# Patient Record
Sex: FEMALE | Race: WHITE | NOT HISPANIC OR LATINO | Employment: FULL TIME | URBAN - METROPOLITAN AREA
[De-identification: names, ages, dates, MRNs, and addresses within clinical notes are randomized per-mention and may not be internally consistent; named-entity substitution may affect disease eponyms.]

---
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
## 2019-01-09 ENCOUNTER — TELEPHONE (OUTPATIENT)
Dept: FAMILY MEDICINE | Facility: CLINIC | Age: 19
End: 2019-01-09

## 2019-01-09 NOTE — TELEPHONE ENCOUNTER
Left message on voicemail advising patient to call back to reschedule her appointment, Catalina will not be in the office tomorrow.

## 2019-01-10 ENCOUNTER — DOCUMENTATION ONLY (OUTPATIENT)
Dept: FAMILY MEDICINE | Facility: CLINIC | Age: 19
End: 2019-01-10

## 2019-01-10 NOTE — PROGRESS NOTES
Pre-Visit Chart Review  For Appointment Scheduled on 1/11/19    Health Maintenance Due   Topic Date Due    Lipid Panel  1999    CHLAMYDIA SCREENING  09/21/2014    HPV Vaccines (1 - Female 3-dose series) 09/21/2014    TETANUS VACCINE  09/21/2017    Influenza Vaccine  08/01/2018

## 2019-01-11 ENCOUNTER — HOSPITAL ENCOUNTER (OUTPATIENT)
Dept: RADIOLOGY | Facility: CLINIC | Age: 19
Discharge: HOME | End: 2019-01-11
Attending: PHYSICIAN ASSISTANT
Payer: OTHER GOVERNMENT

## 2019-01-11 ENCOUNTER — OFFICE VISIT (OUTPATIENT)
Dept: FAMILY MEDICINE | Facility: CLINIC | Age: 19
End: 2019-01-11
Payer: OTHER GOVERNMENT

## 2019-01-11 VITALS
HEIGHT: 67 IN | WEIGHT: 159.38 LBS | SYSTOLIC BLOOD PRESSURE: 130 MMHG | BODY MASS INDEX: 25.01 KG/M2 | DIASTOLIC BLOOD PRESSURE: 91 MMHG | TEMPERATURE: 98 F | HEART RATE: 70 BPM

## 2019-01-11 DIAGNOSIS — Z72.0 NICOTINE VAPOR PRODUCT USER: ICD-10-CM

## 2019-01-11 DIAGNOSIS — G44.89 CHRONIC MIXED HEADACHE SYNDROME: Primary | ICD-10-CM

## 2019-01-11 DIAGNOSIS — G89.29 CHRONIC BILATERAL LOW BACK PAIN WITHOUT SCIATICA: ICD-10-CM

## 2019-01-11 DIAGNOSIS — M54.50 CHRONIC BILATERAL LOW BACK PAIN WITHOUT SCIATICA: ICD-10-CM

## 2019-01-11 DIAGNOSIS — R07.89 CHEST DISCOMFORT: ICD-10-CM

## 2019-01-11 PROCEDURE — 90686 FLU VACCINE (QUAD) GREATER THAN OR EQUAL TO 3YO PRESERVATIVE FREE IM: ICD-10-PCS | Mod: S$GLB,,, | Performed by: PHYSICIAN ASSISTANT

## 2019-01-11 PROCEDURE — 99214 OFFICE O/P EST MOD 30 MIN: CPT | Mod: 25,S$GLB,, | Performed by: PHYSICIAN ASSISTANT

## 2019-01-11 PROCEDURE — 90686 IIV4 VACC NO PRSV 0.5 ML IM: CPT | Mod: S$GLB,,, | Performed by: PHYSICIAN ASSISTANT

## 2019-01-11 PROCEDURE — 72100 X-RAY EXAM L-S SPINE 2/3 VWS: CPT | Mod: 26,,, | Performed by: RADIOLOGY

## 2019-01-11 PROCEDURE — 72100 X-RAY EXAM L-S SPINE 2/3 VWS: CPT | Mod: TC,FY,PO

## 2019-01-11 PROCEDURE — 99999 PR PBB SHADOW E&M-EST. PATIENT-LVL IV: ICD-10-PCS | Mod: PBBFAC,,, | Performed by: PHYSICIAN ASSISTANT

## 2019-01-11 PROCEDURE — 90471 IMMUNIZATION ADMIN: CPT | Mod: S$GLB,,, | Performed by: PHYSICIAN ASSISTANT

## 2019-01-11 PROCEDURE — 99999 PR PBB SHADOW E&M-EST. PATIENT-LVL IV: CPT | Mod: PBBFAC,,, | Performed by: PHYSICIAN ASSISTANT

## 2019-01-11 PROCEDURE — 72100 XR LUMBAR SPINE AP AND LATERAL: ICD-10-PCS | Mod: 26,,, | Performed by: RADIOLOGY

## 2019-01-11 PROCEDURE — 90471 FLU VACCINE (QUAD) GREATER THAN OR EQUAL TO 3YO PRESERVATIVE FREE IM: ICD-10-PCS | Mod: S$GLB,,, | Performed by: PHYSICIAN ASSISTANT

## 2019-01-11 PROCEDURE — 99214 PR OFFICE/OUTPT VISIT, EST, LEVL IV, 30-39 MIN: ICD-10-PCS | Mod: 25,S$GLB,, | Performed by: PHYSICIAN ASSISTANT

## 2019-01-11 RX ORDER — NAPROXEN 500 MG/1
TABLET ORAL
Qty: 60 TABLET | Refills: 0 | Status: SHIPPED | OUTPATIENT
Start: 2019-01-11 | End: 2019-02-07 | Stop reason: SDUPTHER

## 2019-01-11 NOTE — PROGRESS NOTES
"Subjective:       Patient ID: Candelario Nolan is a 18 y.o. female.    Chief Complaint: headaches, chest pain, and Flu Vaccine    HPI   Patient is a 18 year old female presenting to the clinic with multiple complaints.    1) Headaches- occurring daily. Usually always when waking up. Lasts all day unless she takes Goody's powder or exedrin which relieves the headache for a few hours. She has tried imitrex and naproxen without any relief. She gets poor sleep while at college- staying up to 3-4am and waking up early for morning classes, however, she does report getting good sleep while on [place] break (at least 8 hours) and headaches are not improving. She does get nauseated. She has vomited once with the headache. She denies any blurry vision, balance issues.  2) Chest pain- occurring daily, but at random intervals. Occasionally associated with right arm pain and tingling/numbness into her fingers. She also endorses occasionally SOB. She will feel fluttering in her chest that she associates with the CP and without CP. Of note, she is vaping nicotene. She also has the nexplanon birth control inserted into her skin. Her mom reports that she will get frequent phone calls from her daughter crying/saying "I feel like I am dying." She also endorses 2 recent episodes of panic. She reports suddenly feeling overwhelmed and mood changes.  3) Low back pain- chronic; states this has been occurring since she was young and noted scoliosis during grade school physicals. Pain is 6/10 at times depending on what she had been doing. No radiation into legs.   Review of Systems   Constitutional: Negative for activity change, appetite change, chills, diaphoresis, fatigue and fever.   HENT: Negative for congestion, postnasal drip and rhinorrhea.    Respiratory: Negative.  Negative for cough, shortness of breath and wheezing.    Cardiovascular: Positive for chest pain and palpitations. Negative for leg swelling.   Gastrointestinal: Positive " for nausea. Negative for abdominal pain, blood in stool, constipation, diarrhea and vomiting.   Genitourinary: Negative for dysuria, frequency, hematuria and urgency.   Musculoskeletal: Positive for back pain.   Skin: Negative.  Negative for color change and rash.   Neurological: Positive for headaches. Negative for dizziness and syncope.   Psychiatric/Behavioral: Positive for sleep disturbance. Negative for agitation, behavioral problems, confusion, self-injury and suicidal ideas. The patient is nervous/anxious.        Objective:      Physical Exam   Constitutional: Vital signs are normal. She appears well-developed and well-nourished. No distress.   Cardiovascular: Normal rate, regular rhythm, S1 normal, S2 normal and normal heart sounds. Exam reveals no gallop.   No murmur heard.  Pulses:       Radial pulses are 2+ on the right side, and 2+ on the left side.   <2sec cap refill fingers bilat     Pulmonary/Chest: Effort normal and breath sounds normal. No respiratory distress. She has no wheezes. She has no rhonchi.   Skin: Skin is warm and dry. She is not diaphoretic.   Appropriate skin turgor   Psychiatric: She has a normal mood and affect. Her speech is normal and behavior is normal. Judgment and thought content normal. Cognition and memory are normal.       Assessment:       1. Chronic mixed headache syndrome    2. Chest discomfort    3. Chronic bilateral low back pain without sciatica    4. Nicotine vapor product user        Plan:     Candelario was seen today for headaches, chest pain, and flu vaccine.    Diagnoses and all orders for this visit:    Chronic mixed headache syndrome  Jurado tart propranolol for prophylaxis treatment of possible migraines  Discussed adequate sleep, three meals daily, increase water intake  -     Ambulatory referral to Neurology  -     propranolol (INNOPRAN XL) 80 mg 24 hr capsule; Take 1 capsule (80 mg total) by mouth every evening.  -     MRI Brain W WO Contrast; Future    Chest  discomfort  Previous EKG normal, I Doubt ACS  Possibly MVP? R/O PE 2/2 birth control use in combination with nicotene use and history of SOB associated with CP  -     propranolol (INNOPRAN XL) 80 mg 24 hr capsule; Take 1 capsule (80 mg total) by mouth every evening.  -     Echocardiogram stress test (Cupid Only); Future  -     D dimer, quantitative; Future    May or may not related to anxiety with panic attacks?    Chronic bilateral low back pain without sciatica  -     X-Ray Lumbar Spine AP And Lateral; Future  Will f/u with imaging and discuss further recommendations    Nicotine vapor product user  -     D dimer, quantitative; Future

## 2019-01-18 ENCOUNTER — HOSPITAL ENCOUNTER (OUTPATIENT)
Dept: RADIOLOGY | Facility: HOSPITAL | Age: 19
Discharge: HOME | End: 2019-01-18
Attending: PHYSICIAN ASSISTANT
Payer: OTHER GOVERNMENT

## 2019-01-18 DIAGNOSIS — G44.89 CHRONIC MIXED HEADACHE SYNDROME: ICD-10-CM

## 2019-01-18 PROCEDURE — 70553 MRI BRAIN STEM W/O & W/DYE: CPT | Mod: TC

## 2019-01-18 PROCEDURE — A9585 GADOBUTROL INJECTION: HCPCS | Performed by: PHYSICIAN ASSISTANT

## 2019-01-18 PROCEDURE — 70553 MRI BRAIN W WO CONTRAST: ICD-10-PCS | Mod: 26,,, | Performed by: RADIOLOGY

## 2019-01-18 PROCEDURE — 25500020 PHARM REV CODE 255: Performed by: PHYSICIAN ASSISTANT

## 2019-01-18 PROCEDURE — 70553 MRI BRAIN STEM W/O & W/DYE: CPT | Mod: 26,,, | Performed by: RADIOLOGY

## 2019-01-18 RX ORDER — GADOBUTROL 604.72 MG/ML
INJECTION INTRAVENOUS
Status: DISCONTINUED
Start: 2019-01-18 | End: 2019-01-19 | Stop reason: HOSPADM

## 2019-01-18 RX ORDER — GADOBUTROL 604.72 MG/ML
7 INJECTION INTRAVENOUS
Status: COMPLETED | OUTPATIENT
Start: 2019-01-18 | End: 2019-01-18

## 2019-01-18 RX ADMIN — GADOBUTROL 7 ML: 604.72 INJECTION INTRAVENOUS at 06:01

## 2019-01-21 ENCOUNTER — TELEPHONE (OUTPATIENT)
Dept: FAMILY MEDICINE | Facility: CLINIC | Age: 19
End: 2019-01-21

## 2019-01-21 NOTE — TELEPHONE ENCOUNTER
----- Message from Osorio Andrews sent at 1/21/2019 12:02 PM CST -----  Contact: Patient  Returning call from 01-18-19 regarding test results.  Please call [phone] (C)

## 2019-01-24 ENCOUNTER — TELEPHONE (OUTPATIENT)
Dept: FAMILY MEDICINE | Facility: CLINIC | Age: 19
End: 2019-01-24

## 2019-01-24 NOTE — TELEPHONE ENCOUNTER
Please try and get in touch with patient regarding message about echocardiogram.  The [facility] has been trying to get in touch with them regarding outstanding balance.  Since echocardiogram is not urgent, the study will not be performed unless balances discussed.

## 2019-01-25 ENCOUNTER — CLINICAL SUPPORT (OUTPATIENT)
Dept: CARDIOLOGY | Facility: CLINIC | Age: 19
End: 2019-01-25
Attending: PHYSICIAN ASSISTANT
Payer: OTHER GOVERNMENT

## 2019-01-25 VITALS — HEIGHT: 67 IN | WEIGHT: 159.38 LBS | BODY MASS INDEX: 25.01 KG/M2

## 2019-01-25 DIAGNOSIS — R07.89 CHEST DISCOMFORT: ICD-10-CM

## 2019-01-25 LAB
ASCENDING AORTA: 2.43 CM
BSA FOR ECHO PROCEDURE: 1.85 M2
CV ECHO LV RWT: 0.17 CM
CV STRESS BASE HR: 63
DIASTOLIC BLOOD PRESSURE: 79
DOP CALC LVOT AREA: 3.05 CM2
DOP CALC LVOT DIAMETER: 1.97 CM
DOP CALC LVOT PEAK VEL: 1.08 M/S
DOP CALC LVOT STROKE VOLUME: 73.76 CM3
DOP CALCLVOT PEAK VEL VTI: 24.21 CM
E WAVE DECELERATION TIME: 153.24 MSEC
E/A RATIO: 1.5
ECHO LV POSTERIOR WALL: 0.49 CM (ref 0.6–1.1)
FRACTIONAL SHORTENING: 32 % (ref 28–44)
INTERVENTRICULAR SEPTUM: 0.54 CM (ref 0.6–1.1)
IVRT: 0.13 MSEC
LA MAJOR: 4.56 CM
LA WIDTH: 3.09 CM
LEFT ATRIUM SIZE: 3.14 CM
LEFT INTERNAL DIMENSION IN SYSTOLE: 3.89 CM (ref 2.1–4)
LEFT VENTRICLE DIASTOLIC VOLUME INDEX: 88.6 ML/M2
LEFT VENTRICLE DIASTOLIC VOLUME: 162.69 ML
LEFT VENTRICLE MASS INDEX: 55.2 G/M2
LEFT VENTRICLE SYSTOLIC VOLUME INDEX: 35.7 ML/M2
LEFT VENTRICLE SYSTOLIC VOLUME: 65.56 ML
LEFT VENTRICULAR INTERNAL DIMENSION IN DIASTOLE: 5.74 CM (ref 3.5–6)
LEFT VENTRICULAR MASS: 101.41 G
MV PEAK A VEL: 0.46 M/S
MV PEAK E VEL: 0.69 M/S
OHS CV CPX 1 MINUTE RECOVERY HEART RATE: 71 BPM
OHS CV CPX 85 PERCENT MAX PREDICTED HEART RATE MALE: 161
OHS CV CPX ESTIMATED METS: 10
OHS CV CPX MAX PREDICTED HEART RATE: 189
OHS CV CPX PATIENT IS FEMALE: 1
OHS CV CPX PATIENT IS MALE: 0
OHS CV CPX PEAK DIASTOLIC BLOOD PRESSURE: 66 MMHG
OHS CV CPX PEAK HEAR RATE: 136
OHS CV CPX PEAK RATE PRESSURE PRODUCT: ABNORMAL
OHS CV CPX PEAK SYSTOLIC BLOOD PRESSURE: 155
OHS CV CPX PERCENT MAX PREDICTED HEART RATE ACHIEVED: 72
OHS CV CPX PERCENT TARGET HEART RATE ACHIEVED: 71.96
OHS CV CPX RATE PRESSURE PRODUCT PRESENTING: 6993
OHS CV CPX TARGET HEART RATE: 189
PULM VEIN S/D RATIO: 0.95
PV PEAK D VEL: 0.38 M/S
PV PEAK S VEL: 0.36 M/S
RA MAJOR: 4.13 CM
RA WIDTH: 3.49 CM
SINUS: 2.55 CM
STJ: 2.24 CM
STRESS ECHO POST EXERCISE DUR MIN: 6 MIN
STRESS ECHO POST EXERCISE DUR SEC: 30
SYSTOLIC BLOOD PRESSURE: 111

## 2019-01-25 PROCEDURE — 93351 STRESS TTE COMPLETE: CPT | Mod: S$GLB,,, | Performed by: INTERNAL MEDICINE

## 2019-01-25 PROCEDURE — 99999 PR PBB SHADOW E&M-EST. PATIENT-LVL I: ICD-10-PCS | Mod: PBBFAC,,,

## 2019-01-25 PROCEDURE — 99999 PR PBB SHADOW E&M-EST. PATIENT-LVL I: CPT | Mod: PBBFAC,,,

## 2019-01-25 PROCEDURE — 93351 ECHOCARDIOGRAM STRESS TEST (CUPID ONLY): ICD-10-PCS | Mod: S$GLB,,, | Performed by: INTERNAL MEDICINE

## 2019-01-28 DIAGNOSIS — R07.89 CHEST DISCOMFORT: Primary | ICD-10-CM

## 2019-02-04 RX ORDER — TRAZODONE HYDROCHLORIDE 50 MG/1
TABLET ORAL
Qty: 30 TABLET | Refills: 0 | Status: SHIPPED | OUTPATIENT
Start: 2019-02-04 | End: 2019-03-08 | Stop reason: SDUPTHER

## 2019-02-07 DIAGNOSIS — G44.89 CHRONIC MIXED HEADACHE SYNDROME: ICD-10-CM

## 2019-02-07 DIAGNOSIS — R07.89 CHEST DISCOMFORT: ICD-10-CM

## 2019-02-07 RX ORDER — PROPRANOLOL HYDROCHLORIDE 80 MG/1
CAPSULE, EXTENDED RELEASE ORAL
Qty: 30 CAPSULE | Refills: 0 | Status: SHIPPED | OUTPATIENT
Start: 2019-02-07 | End: 2019-03-08 | Stop reason: SDUPTHER

## 2019-02-07 RX ORDER — NAPROXEN 500 MG/1
TABLET ORAL
Qty: 60 TABLET | Refills: 0 | Status: SHIPPED | OUTPATIENT
Start: 2019-02-07 | End: 2019-05-17

## 2019-03-08 DIAGNOSIS — G44.89 CHRONIC MIXED HEADACHE SYNDROME: ICD-10-CM

## 2019-03-08 DIAGNOSIS — R07.89 CHEST DISCOMFORT: ICD-10-CM

## 2019-03-08 RX ORDER — TRAZODONE HYDROCHLORIDE 50 MG/1
TABLET ORAL
Qty: 30 TABLET | Refills: 0 | Status: SHIPPED | OUTPATIENT
Start: 2019-03-08 | End: 2019-05-17

## 2019-03-08 RX ORDER — PROPRANOLOL HYDROCHLORIDE 80 MG/1
CAPSULE, EXTENDED RELEASE ORAL
Qty: 30 CAPSULE | Refills: 0 | Status: SHIPPED | OUTPATIENT
Start: 2019-03-08 | End: 2019-04-19 | Stop reason: SDUPTHER

## 2019-04-19 DIAGNOSIS — R07.89 CHEST DISCOMFORT: ICD-10-CM

## 2019-04-19 DIAGNOSIS — G44.89 CHRONIC MIXED HEADACHE SYNDROME: ICD-10-CM

## 2019-04-22 NOTE — TELEPHONE ENCOUNTER
Is this patient still seeing Dr. Willis for her pcp? If so she needs to get this from her, if not needs to establish

## 2019-04-23 RX ORDER — PROPRANOLOL HYDROCHLORIDE 80 MG/1
CAPSULE, EXTENDED RELEASE ORAL
Qty: 30 CAPSULE | Refills: 0 | Status: SHIPPED | OUTPATIENT
Start: 2019-04-23 | End: 2019-05-17 | Stop reason: SDUPTHER

## 2019-04-24 ENCOUNTER — PATIENT MESSAGE (OUTPATIENT)
Dept: FAMILY MEDICINE | Facility: CLINIC | Age: 19
End: 2019-04-24

## 2019-04-24 NOTE — TELEPHONE ENCOUNTER
Please see e-mail and reach out to patient's mother if there is an involvement of care on file as patient is 18 years old.      It appears that they made a mychart appointment with me for May. Kindly let them know that I am not accepting additional patients at this time as I am leaving.  If she is not following up with Dr. Willis who is listed as her PCP please have her establish care with a new PCP such as Dr. Galicia, Dr. Barber. They may have sooner appointments available and will be happy to address her concerns.

## 2019-05-17 ENCOUNTER — OFFICE VISIT (OUTPATIENT)
Dept: FAMILY MEDICINE | Facility: CLINIC | Age: 19
End: 2019-05-17
Payer: OTHER GOVERNMENT

## 2019-05-17 VITALS
HEART RATE: 60 BPM | HEIGHT: 67 IN | TEMPERATURE: 98 F | WEIGHT: 156.94 LBS | DIASTOLIC BLOOD PRESSURE: 79 MMHG | SYSTOLIC BLOOD PRESSURE: 115 MMHG | BODY MASS INDEX: 24.63 KG/M2

## 2019-05-17 DIAGNOSIS — G44.89 CHRONIC MIXED HEADACHE SYNDROME: ICD-10-CM

## 2019-05-17 DIAGNOSIS — F41.9 ANXIETY AND DEPRESSION: ICD-10-CM

## 2019-05-17 DIAGNOSIS — F32.A ANXIETY AND DEPRESSION: ICD-10-CM

## 2019-05-17 DIAGNOSIS — I10 ESSENTIAL HYPERTENSION: ICD-10-CM

## 2019-05-17 DIAGNOSIS — Z13.220 SCREENING FOR LIPID DISORDERS: Primary | ICD-10-CM

## 2019-05-17 PROBLEM — M54.50 CHRONIC BILATERAL LOW BACK PAIN WITHOUT SCIATICA: Status: ACTIVE | Noted: 2019-05-17

## 2019-05-17 PROBLEM — G47.09 OTHER INSOMNIA: Status: RESOLVED | Noted: 2019-05-17 | Resolved: 2019-05-17

## 2019-05-17 PROBLEM — Z72.0 NICOTINE VAPOR PRODUCT USER: Status: RESOLVED | Noted: 2019-05-17 | Resolved: 2019-05-17

## 2019-05-17 PROBLEM — G89.29 CHRONIC BILATERAL LOW BACK PAIN WITHOUT SCIATICA: Status: ACTIVE | Noted: 2019-05-17

## 2019-05-17 PROBLEM — Z72.0 NICOTINE VAPOR PRODUCT USER: Status: ACTIVE | Noted: 2019-05-17

## 2019-05-17 PROBLEM — G47.09 OTHER INSOMNIA: Status: ACTIVE | Noted: 2019-05-17

## 2019-05-17 PROCEDURE — 99999 PR PBB SHADOW E&M-EST. PATIENT-LVL III: CPT | Mod: PBBFAC,,, | Performed by: FAMILY MEDICINE

## 2019-05-17 PROCEDURE — 99213 PR OFFICE/OUTPT VISIT, EST, LEVL III, 20-29 MIN: ICD-10-PCS | Mod: S$GLB,,, | Performed by: FAMILY MEDICINE

## 2019-05-17 PROCEDURE — 99213 OFFICE O/P EST LOW 20 MIN: CPT | Mod: S$GLB,,, | Performed by: FAMILY MEDICINE

## 2019-05-17 PROCEDURE — 99999 PR PBB SHADOW E&M-EST. PATIENT-LVL III: ICD-10-PCS | Mod: PBBFAC,,, | Performed by: FAMILY MEDICINE

## 2019-05-17 RX ORDER — MEDROXYPROGESTERONE ACETATE 150 MG/ML
INJECTION, SUSPENSION INTRAMUSCULAR
Refills: 3 | COMMUNITY
Start: 2019-05-03 | End: 2022-12-05 | Stop reason: ALTCHOICE

## 2019-05-17 RX ORDER — ESCITALOPRAM OXALATE 10 MG/1
10 TABLET ORAL DAILY
COMMUNITY
End: 2019-05-17 | Stop reason: SDUPTHER

## 2019-05-17 RX ORDER — ESCITALOPRAM OXALATE 10 MG/1
10 TABLET ORAL DAILY
Qty: 90 TABLET | Refills: 1 | Status: SHIPPED | OUTPATIENT
Start: 2019-05-17 | End: 2019-07-18 | Stop reason: SDUPTHER

## 2019-05-17 RX ORDER — TOBRAMYCIN 3 MG/ML
SOLUTION/ DROPS OPHTHALMIC
COMMUNITY
Start: 2016-10-02 | End: 2019-05-17

## 2019-05-17 RX ORDER — NORETHINDRONE ACETATE AND ETHINYL ESTRADIOL AND FERROUS FUMARATE 1MG-20(24)
KIT ORAL
COMMUNITY
Start: 2016-09-22 | End: 2019-05-17

## 2019-05-17 NOTE — PROGRESS NOTES
Subjective:       Patient ID: Candelario Nolan is a 18 y.o. female.    Chief Complaint: Establish Care    HPI   Patient presents with her mother to establish care with a new family doctor and for medication refills.  She was previously followed by Dr. Vazquez last seen about 1 year ago but has been following with advanced practice providers within our system since this time.  She previously has had multitude of complaints that has led to a large workup for cardiac and neurologic causes but mother tells me they believe all of the symptoms have been secondary to anxiety and depression has she has been doing very well since establishing with the health clinic at her school ([facility] [employer] [facility] in [place]).  She was diagnosed with anxiety depression there and started on Lexapro 10 mg daily several months ago.  She has also been following with counselors there and notes that she has been feeling significantly better over time with less and less anxiety depression symptoms.  She currently denies any symptoms and tells me she feels fine without any feelings of helplessness, hopelessness or worthlessness.  She has not been having any tearful episodes recently and denies ever having any panic attacks but tells me her anxiety can get high enough where she has to leave social situations and then has felt depressed that she had to leave and be on social.  She is sleeping well now and has actually discontinue trazodone which she was using for sleep previously.  She is also eating well.  She has a history of high blood pressure and migraines but using Innopran and starting Lexapro she tells me her migraines are now well controlled and she no longer has to use sumatriptan for these.  She follows with her gyn for birth control and was just recently switched to Depo-Provera as she had some problems with vaginal bleeding with use of Nexplanon.  Vaginal bleeding has resolved.    Review of Systems   Constitutional:  "Negative for activity change, appetite change, fatigue and unexpected weight change.   Respiratory: Negative for cough, chest tightness, shortness of breath and wheezing.    Cardiovascular: Negative for chest pain, palpitations and leg swelling.   Gastrointestinal: Negative for abdominal pain, blood in stool, constipation, nausea and vomiting.   Genitourinary: Negative for decreased urine volume, difficulty urinating, dysuria, flank pain, frequency, genital sores, hematuria, menstrual problem, pelvic pain, urgency, vaginal bleeding, vaginal discharge and vaginal pain.   Musculoskeletal: Negative for arthralgias and myalgias.   Skin: Negative for rash and wound.   Neurological: Negative for headaches.   Hematological: Negative for adenopathy. Does not bruise/bleed easily.   Psychiatric/Behavioral: Negative for agitation, behavioral problems, confusion, decreased concentration, dysphoric mood, hallucinations, self-injury, sleep disturbance and suicidal ideas. The patient is not nervous/anxious and is not hyperactive.          Objective:      Vitals:    05/17/19 1259   BP: 115/79   Pulse: 60   Temp: 98.2 °F (36.8 °C)   TempSrc: Oral   Weight: 71.2 kg (156 lb 15.5 oz)   Height: 5' 7" (1.702 m)   PainSc: 0-No pain     Physical Exam   Constitutional: She is oriented to person, place, and time. She appears well-developed and well-nourished. No distress.   HENT:   Head: Normocephalic and atraumatic.   Right Ear: External ear normal.   Left Ear: External ear normal.   Nose: Nose normal.   Mouth/Throat: Oropharynx is clear and moist.   Eyes: Pupils are equal, round, and reactive to light. Conjunctivae and EOM are normal. No scleral icterus.   Neck: Trachea normal, normal range of motion and phonation normal. Neck supple. No JVD present. No thyroid mass and no thyromegaly present.   Cardiovascular: Normal rate, regular rhythm and normal heart sounds. Exam reveals no gallop and no friction rub.   No murmur " heard.  Pulmonary/Chest: Effort normal and breath sounds normal. No respiratory distress. She has no wheezes.   Abdominal: Soft. Bowel sounds are normal. She exhibits no distension and no mass. There is no tenderness. There is no guarding.   Musculoskeletal: Normal range of motion. She exhibits no edema or deformity.   Lymphadenopathy:     She has no cervical adenopathy.   Neurological: She is alert and oriented to person, place, and time.   Skin: Skin is warm and dry. She is not diaphoretic.   Psychiatric: She has a normal mood and affect. Her behavior is normal. Judgment and thought content normal.   Nursing note and vitals reviewed.        Assessment:       1. Screening for lipid disorders    2. Essential hypertension    3. Anxiety and depression    4. Chronic mixed headache syndrome          Plan:   Screening for lipid disorders  -     Lipid panel; Future; Expected date: 05/17/2019    Essential hypertension  -     propranolol (INNOPRAN XL) 80 mg 24 hr capsule; TAKE ONE CAPSULE BY MOUTH ONCE EVERY EVENING  Dispense: 90 capsule; Refill: 1    Anxiety and depression  -     escitalopram oxalate (LEXAPRO) 10 MG tablet; Take 1 tablet (10 mg total) by mouth once daily.  Dispense: 90 tablet; Refill: 1    Chronic mixed headache syndrome  -     propranolol (INNOPRAN XL) 80 mg 24 hr capsule; TAKE ONE CAPSULE BY MOUTH ONCE EVERY EVENING  Dispense: 90 capsule; Refill: 1      Follow up in about 6 months (around 11/17/2019).    Candelario appears to be stable and doing well today on her current medications and I am happy to take over prescribing her Lexapro as she no longer feels she needs to follow up with her care team at school.  I did advise her to continue with counseling if at all possible as studies have shown this as just as effective as SSRIs.  I did discuss the risks and benefits of all of her medications today and her blood pressures and migraines are well controlled as well.  I will provide her with 90 day supplies with  1 refill of her Lexapro and Innopran unchanged and follow up with her at 6 month interval as they request.  I did review health maintenance issues with them and they do not believe she has ever had a screening lipid panel completed.  She was not fasting today but they were interested in this order and will call back to schedule a lab appointment.  They tell me she is up-to-date with Tdap vaccination receiving this just prior to starting college and they have her vaccination card at home to show this and will return with a copy as they cannot recall where she received the vaccine and I do not see documentation of this through the links system.  They also believe she had all 3 doses of the Gardasil vaccination but not through her current gyn and are not sure where this was completed.  They have records of these as well and will drop these off.  She has had STD screening through her gyn this year and I have asked my nurse to obtain these records.  We will update all of these health maintenance issues once records are received.

## 2019-07-18 ENCOUNTER — PATIENT MESSAGE (OUTPATIENT)
Dept: FAMILY MEDICINE | Facility: CLINIC | Age: 19
End: 2019-07-18

## 2019-07-18 DIAGNOSIS — F32.A ANXIETY AND DEPRESSION: ICD-10-CM

## 2019-07-18 DIAGNOSIS — F41.9 ANXIETY AND DEPRESSION: ICD-10-CM

## 2019-07-18 RX ORDER — ESCITALOPRAM OXALATE 20 MG/1
20 TABLET ORAL DAILY
Qty: 90 TABLET | Refills: 1 | Status: SHIPPED | OUTPATIENT
Start: 2019-07-18 | End: 2020-01-13 | Stop reason: SDUPTHER

## 2019-11-18 DIAGNOSIS — I10 ESSENTIAL HYPERTENSION: ICD-10-CM

## 2019-11-18 DIAGNOSIS — G44.89 CHRONIC MIXED HEADACHE SYNDROME: ICD-10-CM

## 2020-01-13 DIAGNOSIS — F41.9 ANXIETY AND DEPRESSION: ICD-10-CM

## 2020-01-13 DIAGNOSIS — F32.A ANXIETY AND DEPRESSION: ICD-10-CM

## 2020-01-13 RX ORDER — ESCITALOPRAM OXALATE 20 MG/1
20 TABLET ORAL DAILY
Qty: 90 TABLET | Refills: 1 | Status: SHIPPED | OUTPATIENT
Start: 2020-01-13 | End: 2020-07-06

## 2020-03-20 ENCOUNTER — PATIENT MESSAGE (OUTPATIENT)
Dept: FAMILY MEDICINE | Facility: CLINIC | Age: 20
End: 2020-03-20

## 2020-03-20 NOTE — TELEPHONE ENCOUNTER
Spoke to patient via phone. Patient has been advised that Dr. Reyes is out of the clinic until Monday, she states that she will go to urgent care if things worsens. I let patient know that if there is anything else I can do to please let me know.

## 2020-03-27 ENCOUNTER — PATIENT MESSAGE (OUTPATIENT)
Dept: FAMILY MEDICINE | Facility: CLINIC | Age: 20
End: 2020-03-27

## 2020-03-27 ENCOUNTER — HOSPITAL ENCOUNTER (EMERGENCY)
Facility: HOSPITAL | Age: 20
Discharge: HOME | End: 2020-03-27
Attending: EMERGENCY MEDICINE
Payer: OTHER GOVERNMENT

## 2020-03-27 VITALS
TEMPERATURE: 98 F | HEART RATE: 70 BPM | DIASTOLIC BLOOD PRESSURE: 83 MMHG | RESPIRATION RATE: 18 BRPM | WEIGHT: 177 LBS | SYSTOLIC BLOOD PRESSURE: 120 MMHG | OXYGEN SATURATION: 99 % | HEIGHT: 67 IN | BODY MASS INDEX: 27.78 KG/M2

## 2020-03-27 DIAGNOSIS — R21 RASH: Primary | ICD-10-CM

## 2020-03-27 PROCEDURE — 99281 EMR DPT VST MAYX REQ PHY/QHP: CPT

## 2020-03-27 RX ORDER — DOXYCYCLINE 100 MG/1
CAPSULE ORAL
COMMUNITY
Start: 2020-03-25 | End: 2020-03-27

## 2020-03-27 RX ORDER — PREDNISONE 10 MG/1
TABLET ORAL
COMMUNITY
Start: 2020-03-25 | End: 2020-07-06

## 2020-03-27 RX ORDER — HYDROXYZINE HYDROCHLORIDE 25 MG/1
TABLET, FILM COATED ORAL
COMMUNITY
Start: 2020-03-25 | End: 2020-07-06

## 2020-03-28 NOTE — DISCHARGE INSTRUCTIONS
I think ER rash is more likely to be a drug reaction to the antibiotics either the azithromycin or the doxycycline than a true urticarial allergic reaction.  This is probably why the prednisone and Benadryl are not particularly helpful.  If you do have mycoplasma it is typically self limited and usually does not need antibiotics anyway.  Given that your symptoms are improving I think the antibiotics should be stopped as at this time they are likely to be causing the rash and probably not improving your other symptoms.  I am not sure right now why you have a headache and pain down your arms but there is no sign at all that you have meningitis which can be seen with several viral infections.  you need to return to the emergency room if your condition worsens after stopping the doxycycline.  Please use Benadryl as needed for the rash and you can continue the prednisone.  If you develop a high fever neck stiffness or worsening respiratory symptoms please return.

## 2020-03-28 NOTE — ED PROVIDER NOTES
Encounter Date: 3/27/2020    SCRIBE #1 NOTE: Kaia TATE, cayden scribing for, and in the presence of, Tyrone Huerta MD.       History     Chief Complaint   Patient presents with    Urticaria     for few days       Time seen by provider: 10:16 PM on 03/27/2020    Candelario Nolan is a 19 y.o. female without significant PMHx who presents to the ED with an onset of a diffuse, itchy rash x2 days. She began having a cough, congestion, and sore throat without fever one week ago. She reports she was positive for mycoplasma PNA at her [facility] clinic [facility]. She was negative flu, strep and COVID-19. She started on azithromycin monday but stopped taking it when the rash began Wednesday. She endorses that upper respiratory symptoms are improving. She denies improvement of rash with benadryl or rash creams. She also c/o intermittent temporal headache, neck pain and shooting pains down the arms. The patient denies shortness of breath, chest pain, neck stiffness, or any other symptoms at this time. No recent PSHx.  Denies fever.    The history is provided by the patient.     Review of patient's allergies indicates:   Allergen Reactions    Azithromycin Hives     Past Medical History:   Diagnosis Date    Hypertension      Past Surgical History:   Procedure Laterality Date    FOOT SURGERY Right     fracture repair     Family History   Problem Relation Age of Onset    Asthma Mother     Allergies Mother     Thyroid disease Father         hypothyroidism    Cancer Paternal Grandmother         lung cancer    Hypertension Paternal Grandfather     Diabetes Paternal Grandfather      Social History     Tobacco Use    Smoking status: Never Smoker    Smokeless tobacco: Never Used   Substance Use Topics    Alcohol use: No    Drug use: No     Review of Systems   Constitutional: Negative for fever.   HENT: Negative for congestion, rhinorrhea and sore throat.    Respiratory: Negative for shortness of breath.    Cardiovascular:  Negative for chest pain.   Gastrointestinal: Negative for nausea.   Musculoskeletal: Positive for neck pain (none currently). Negative for neck stiffness.   Skin: Positive for rash.   Neurological: Positive for headaches.   Hematological: Does not bruise/bleed easily.       Physical Exam     Initial Vitals [03/27/20 2213]   BP Pulse Resp Temp SpO2   120/83 70 18 97.7 °F (36.5 °C) 99 %      MAP       --         Physical Exam    Nursing note and vitals reviewed.  Constitutional: She appears well-developed and well-nourished. She is not diaphoretic. No distress.   Well-appearing   HENT:   Head: Normocephalic and atraumatic.   Eyes: EOM are normal.   Neck: Normal range of motion. Neck supple.   There is no neck stiffness, full range of motion   Cardiovascular: Normal rate and regular rhythm.   Pulmonary/Chest: Breath sounds normal. No respiratory distress. She has no wheezes.   Normal breath sounds   Musculoskeletal: Normal range of motion.   Neurological: She is alert and oriented to person, place, and time.   Skin: Skin is warm and dry. Rash noted.   Diffuse, mildly-indurated, faint splotchy rash. In a few areas does appear like hives but it predominantly is not representative of hives   Psychiatric: She has a normal mood and affect. Her behavior is normal. Judgment and thought content normal.         ED Course   Procedures  Labs Reviewed - No data to display       Imaging Results    None          Medical Decision Making:   History:   Old Medical Records: I decided to obtain old medical records.            Scribe Attestation:   Scribe #1: I performed the above scribed service and the documentation accurately describes the services I performed. I attest to the accuracy of the note.    I, Dr. Huerta, personally performed the services described in this documentation. All medical record entries made by the scribe were at my direction and in my presence.  I have reviewed the chart and agree that the record reflects my  personal performance and is accurate and complete.11:16 PM 03/27/2020            ED Course as of Mar 27 2244   Fri Mar 27, 2020   2231 19-year-old healthy college student presents to the emergency department complaining of a diffuse rash.  This started on Wednesday.  She was started on azithromycin on Monday for presumed mycoplasma.  She states she was tested at her [facility].  At that time she had a sore throat stuffy nose runny nose congestion, Classic URI symptoms.  She never had chest x-ray performed.  She has not had any shortness of breath.  She has also been having a headache.  She has described shooting pains down the arms at times.  She does not appear to have meningitis clinically.  I do not think she has any other life-threatening cause of headache such as idiopathic intracranial hypertension carotid or vertebral dissection.  She is able to shake her head rapidly from side to side.  She is afebrile.  I do not think she needs a lumbar puncture.  She stop the azithromycin several days ago after the rash began.  She has diffuse mild slightly indurated splotchy rash.  This is more pronounced several days ago in the pictures and appears to be a drug rash.  I do not think this is urticaria or an acute allergic reaction.  She is very well-appearing.  She has been taking doxycycline at this time.  Even if she does have mycoplasma this is typically self-limited.  I do not think she needs chest x-ray.  I think she needs to stop the doxycycline and continue only p.r.n. Benadryl and the prednisone that was prescribed.  I do not think she needs a chest x-ray at this time.  Normal vital signs, healthy female in no distress.  Tested earlier for corona virus.  She needs to stop antibiotics as this is likely clouding the picture and again I do not think this is necessarily indicated for a presumed mycoplasma infection as most of these are self-limited.  I have advised her to return to the ER if her condition  worsens but to stop the antibiotics as they might be causing a drug rash.  I think more likely to be a drug rash than urticaria at this time.    [EF]      ED Course User Index  [EF] Tyrone Huerta MD                Clinical Impression:       ICD-10-CM ICD-9-CM   1. Rash R21 782.1         Disposition:   Disposition: Discharged  Condition: Stable     ED Disposition Condition    Discharge Stable        ED Prescriptions     None        Follow-up Information     Follow up With Specialties Details Why Contact Info    [facility] Emergency Medicine  As needed, If symptoms worsen [address] [place] [id number] [phone]                                     Tyrone Huerta MD  03/27/20 6412

## 2020-05-05 ENCOUNTER — PATIENT MESSAGE (OUTPATIENT)
Dept: ADMINISTRATIVE | Facility: HOSPITAL | Age: 20
End: 2020-05-05

## 2020-07-06 ENCOUNTER — OFFICE VISIT (OUTPATIENT)
Dept: FAMILY MEDICINE | Facility: CLINIC | Age: 20
End: 2020-07-06
Payer: OTHER GOVERNMENT

## 2020-07-06 VITALS
DIASTOLIC BLOOD PRESSURE: 82 MMHG | BODY MASS INDEX: 29.38 KG/M2 | HEIGHT: 67 IN | WEIGHT: 187.19 LBS | OXYGEN SATURATION: 98 % | TEMPERATURE: 98 F | SYSTOLIC BLOOD PRESSURE: 106 MMHG | HEART RATE: 80 BPM

## 2020-07-06 DIAGNOSIS — G44.89 CHRONIC MIXED HEADACHE SYNDROME: Primary | ICD-10-CM

## 2020-07-06 DIAGNOSIS — F32.A ANXIETY AND DEPRESSION: ICD-10-CM

## 2020-07-06 DIAGNOSIS — F41.9 ANXIETY AND DEPRESSION: ICD-10-CM

## 2020-07-06 PROBLEM — I10 ESSENTIAL HYPERTENSION: Status: RESOLVED | Noted: 2019-05-17 | Resolved: 2020-07-06

## 2020-07-06 PROCEDURE — 99999 PR PBB SHADOW E&M-EST. PATIENT-LVL III: CPT | Mod: PBBFAC,,, | Performed by: FAMILY MEDICINE

## 2020-07-06 PROCEDURE — 99214 OFFICE O/P EST MOD 30 MIN: CPT | Mod: S$GLB,,, | Performed by: FAMILY MEDICINE

## 2020-07-06 PROCEDURE — 99214 PR OFFICE/OUTPT VISIT, EST, LEVL IV, 30-39 MIN: ICD-10-PCS | Mod: S$GLB,,, | Performed by: FAMILY MEDICINE

## 2020-07-06 PROCEDURE — 99999 PR PBB SHADOW E&M-EST. PATIENT-LVL III: ICD-10-PCS | Mod: PBBFAC,,, | Performed by: FAMILY MEDICINE

## 2020-07-06 RX ORDER — BUTALBITAL, ACETAMINOPHEN AND CAFFEINE 50; 325; 40 MG/1; MG/1; MG/1
1 TABLET ORAL EVERY 4 HOURS PRN
Qty: 30 TABLET | Refills: 0 | Status: SHIPPED | OUTPATIENT
Start: 2020-07-06 | End: 2020-08-05

## 2020-07-06 RX ORDER — ESCITALOPRAM OXALATE 20 MG/1
20 TABLET ORAL
COMMUNITY
End: 2020-07-08 | Stop reason: SDUPTHER

## 2020-07-06 RX ORDER — NABUMETONE 500 MG/1
500 TABLET, FILM COATED ORAL 2 TIMES DAILY
Qty: 60 TABLET | Refills: 0 | Status: SHIPPED | OUTPATIENT
Start: 2020-07-06 | End: 2020-07-06

## 2020-07-06 RX ORDER — PROPRANOLOL HYDROCHLORIDE 20 MG/1
20 TABLET ORAL
COMMUNITY
End: 2020-07-06

## 2020-07-06 NOTE — PROGRESS NOTES
Subjective:       Patient ID: Candelario Nolan is a 19 y.o. female.    Chief Complaint: Dizziness    Here today with complaints of persistent headaches following episode of dizziness and one episode of syncope.  She states that about two weeks ago she was at the dentist when she had her blood pressure checked and she was told her blood pressure was very low.  That night when she was in the shower she lost consciousness.  She was told to stop taking her blood pressure medicine which she did.  Her dizziness has resolved but since stopping her blood pressure medicine she has been having daily headaches.  She has a history of migraines and states these are not like her migraines.  They are just frontal headaches and seemed to happen every day.  She has tried taking ibuprofen and Aleve with some help.  When headaches are bad they are accompanied with some nausea or photophobia.    Review of Systems   Constitutional: Negative for activity change and unexpected weight change.   HENT: Negative for hearing loss, rhinorrhea and trouble swallowing.    Eyes: Negative for discharge and visual disturbance.   Respiratory: Negative for chest tightness and wheezing.    Cardiovascular: Negative for chest pain and palpitations.   Gastrointestinal: Negative for blood in stool, constipation, diarrhea and vomiting.   Endocrine: Negative for polydipsia and polyuria.   Genitourinary: Negative for difficulty urinating, dysuria, hematuria and menstrual problem.   Musculoskeletal: Negative for arthralgias, joint swelling and neck pain.   Neurological: Positive for headaches. Negative for weakness.   Psychiatric/Behavioral: Negative for confusion and dysphoric mood.         Objective:      Physical Exam  Vitals signs and nursing note reviewed.   Constitutional:       Appearance: She is well-developed.   HENT:      Head: Normocephalic and atraumatic.   Eyes:      Pupils: Pupils are equal, round, and reactive to light.   Neck:      Musculoskeletal:  Normal range of motion and neck supple.   Cardiovascular:      Rate and Rhythm: Normal rate and regular rhythm.      Heart sounds: No murmur.   Pulmonary:      Effort: Pulmonary effort is normal. No respiratory distress.      Breath sounds: Normal breath sounds. No wheezing or rales.   Abdominal:      General: There is no distension.      Palpations: Abdomen is soft.      Tenderness: There is no abdominal tenderness. There is no guarding.   Musculoskeletal: Normal range of motion.   Skin:     General: Skin is warm and dry.   Neurological:      Mental Status: She is alert and oriented to person, place, and time.      Deep Tendon Reflexes: Reflexes normal.   Psychiatric:         Behavior: Behavior normal.         Thought Content: Thought content normal.         Judgment: Judgment normal.         Assessment:       1. Chronic mixed headache syndrome    2. Anxiety and depression        Plan:       -advised patient to stop blood pressure medication as her blood pressure is low normal today on no meds for the last two weeks.  -will prescribe Relafen for headaches as well as Fioricet for severe migraine headaches.  -follow-up with PCP in about one month

## 2020-07-08 RX ORDER — ESCITALOPRAM OXALATE 20 MG/1
20 TABLET ORAL DAILY
Qty: 90 TABLET | Refills: 3 | Status: SHIPPED | OUTPATIENT
Start: 2020-07-08 | End: 2021-07-06 | Stop reason: SDUPTHER

## 2020-07-08 NOTE — TELEPHONE ENCOUNTER
Pt requesting Escitalopram.     LOV: 7/6/20 ([place])  LAB: 11/2/18  No upcoming appointment scheduled.

## 2020-10-15 LAB
HUMAN PAPILLOMAVIRUS (HPV): NORMAL
PAP RECOMMENDATION EXT: NORMAL
PAP SMEAR: NORMAL
PAP SMEAR: NORMAL

## 2020-10-26 ENCOUNTER — OFFICE VISIT (OUTPATIENT)
Dept: FAMILY MEDICINE | Facility: CLINIC | Age: 20
End: 2020-10-26
Payer: OTHER GOVERNMENT

## 2020-10-26 ENCOUNTER — PATIENT OUTREACH (OUTPATIENT)
Dept: ADMINISTRATIVE | Facility: HOSPITAL | Age: 20
End: 2020-10-26

## 2020-10-26 ENCOUNTER — LAB VISIT (OUTPATIENT)
Dept: PRIMARY CARE CLINIC | Facility: CLINIC | Age: 20
End: 2020-10-26
Payer: OTHER GOVERNMENT

## 2020-10-26 VITALS
HEART RATE: 67 BPM | OXYGEN SATURATION: 98 % | WEIGHT: 189.13 LBS | TEMPERATURE: 99 F | BODY MASS INDEX: 29.63 KG/M2 | DIASTOLIC BLOOD PRESSURE: 78 MMHG | SYSTOLIC BLOOD PRESSURE: 112 MMHG

## 2020-10-26 DIAGNOSIS — R10.9 ABDOMINAL PAIN, UNSPECIFIED ABDOMINAL LOCATION: Primary | ICD-10-CM

## 2020-10-26 DIAGNOSIS — Z86.16 HISTORY OF 2019 NOVEL CORONAVIRUS DISEASE (COVID-19): ICD-10-CM

## 2020-10-26 DIAGNOSIS — Z03.818 ENCNTR FOR OBS FOR SUSP EXPSR TO OTH BIOLG AGENTS RULED OUT: ICD-10-CM

## 2020-10-26 DIAGNOSIS — Z03.818 ENCNTR FOR OBS FOR SUSP EXPSR TO OTH BIOLG AGENTS RULED OUT: Primary | ICD-10-CM

## 2020-10-26 DIAGNOSIS — R43.0 LOSS OF SMELL: ICD-10-CM

## 2020-10-26 DIAGNOSIS — N30.00 ACUTE CYSTITIS WITHOUT HEMATURIA: ICD-10-CM

## 2020-10-26 LAB
B-HCG UR QL: NEGATIVE
BILIRUB SERPL-MCNC: ABNORMAL MG/DL
BLOOD URINE, POC: ABNORMAL
CLARITY, POC UA: ABNORMAL
COLOR, POC UA: YELLOW
CTP QC/QA: YES
GLUCOSE UR QL STRIP: NORMAL
KETONES UR QL STRIP: ABNORMAL
LEUKOCYTE ESTERASE URINE, POC: ABNORMAL
NITRITE, POC UA: ABNORMAL
PH, POC UA: 5
PROTEIN, POC: ABNORMAL
SPECIFIC GRAVITY, POC UA: 1.02
UROBILINOGEN, POC UA: NORMAL

## 2020-10-26 PROCEDURE — 81002 POCT URINE DIPSTICK WITHOUT MICROSCOPE: ICD-10-PCS | Mod: S$GLB,,, | Performed by: FAMILY MEDICINE

## 2020-10-26 PROCEDURE — U0003 INFECTIOUS AGENT DETECTION BY NUCLEIC ACID (DNA OR RNA); SEVERE ACUTE RESPIRATORY SYNDROME CORONAVIRUS 2 (SARS-COV-2) (CORONAVIRUS DISEASE [COVID-19]), AMPLIFIED PROBE TECHNIQUE, MAKING USE OF HIGH THROUGHPUT TECHNOLOGIES AS DESCRIBED BY CMS-2020-01-R: HCPCS

## 2020-10-26 PROCEDURE — 99214 OFFICE O/P EST MOD 30 MIN: CPT | Mod: 25,S$GLB,, | Performed by: FAMILY MEDICINE

## 2020-10-26 PROCEDURE — 99999 PR PBB SHADOW E&M-EST. PATIENT-LVL IV: ICD-10-PCS | Mod: PBBFAC,,, | Performed by: FAMILY MEDICINE

## 2020-10-26 PROCEDURE — 81002 URINALYSIS NONAUTO W/O SCOPE: CPT | Mod: S$GLB,,, | Performed by: FAMILY MEDICINE

## 2020-10-26 PROCEDURE — 90686 FLU VACCINE (QUAD) GREATER THAN OR EQUAL TO 3YO PRESERVATIVE FREE IM: ICD-10-PCS | Mod: S$GLB,,, | Performed by: FAMILY MEDICINE

## 2020-10-26 PROCEDURE — 90471 IMMUNIZATION ADMIN: CPT | Mod: S$GLB,,, | Performed by: FAMILY MEDICINE

## 2020-10-26 PROCEDURE — 99999 PR PBB SHADOW E&M-EST. PATIENT-LVL IV: CPT | Mod: PBBFAC,,, | Performed by: FAMILY MEDICINE

## 2020-10-26 PROCEDURE — 90471 FLU VACCINE (QUAD) GREATER THAN OR EQUAL TO 3YO PRESERVATIVE FREE IM: ICD-10-PCS | Mod: S$GLB,,, | Performed by: FAMILY MEDICINE

## 2020-10-26 PROCEDURE — 99214 PR OFFICE/OUTPT VISIT, EST, LEVL IV, 30-39 MIN: ICD-10-PCS | Mod: 25,S$GLB,, | Performed by: FAMILY MEDICINE

## 2020-10-26 PROCEDURE — 90686 IIV4 VACC NO PRSV 0.5 ML IM: CPT | Mod: S$GLB,,, | Performed by: FAMILY MEDICINE

## 2020-10-26 RX ORDER — NITROFURANTOIN 25; 75 MG/1; MG/1
100 CAPSULE ORAL 2 TIMES DAILY
Qty: 10 CAPSULE | Refills: 0 | Status: SHIPPED | OUTPATIENT
Start: 2020-10-26 | End: 2020-10-31

## 2020-10-26 RX ORDER — PANTOPRAZOLE SODIUM 40 MG/1
40 TABLET, DELAYED RELEASE ORAL DAILY
Qty: 30 TABLET | Refills: 11 | Status: SHIPPED | OUTPATIENT
Start: 2020-10-26 | End: 2024-03-08 | Stop reason: ALTCHOICE

## 2020-10-26 NOTE — PROGRESS NOTES
Subjective:       Patient ID: Candelario Nolan is a 20 y.o. female.    Chief Complaint: Abdominal Pain (bloating)    Patient presents today with a two week history of stomach pain that initially started in the right lower quadrant but has radiated over to the left lower quadrant.  Described as a feeling of period  Cramps.  Excepted always seems to be present after she eats.  It is sometimes associated with nausea.  A week ago she saw gyn in [place] where Pap smear was performed and she was told she had endometriosis.  She was given Flagyl as treatment which did not seem to help at all.  It was also suggested to her she could have IBS or gluten intolerance.  She attempted to cut gluten out of her diet but this is not change her symptoms at all.  She does report having some urinary symptoms along with this.  Mainly she is having increased urinary frequency.    She is also complaining of the prolonged inability to smell other than the sensation of smelling something burning.  She was diagnosed with COVID-19 three months ago with the initial symptom being loss of smell.  She has recovered but her loss of smell has persisted.    Also concerned about her current dose of Lexapro.  She has been on 40 mg for over a year but has noted she is still having certain episodes where she feels overly anxious.  She is wondering if it could also be related to her birth control.  She is currently using the Depo-Medrol shot and she is scheduled for next one today with her gyn.  However she does not seem to preferred as a form of birth control and was considering talking to her gyn about an alternative form.    Review of Systems   Constitutional: Negative for activity change, chills and fever.   HENT: Negative for nasal congestion and sinus pressure/congestion.    Eyes: Negative for itching.   Respiratory: Negative for chest tightness and shortness of breath.    Cardiovascular: Negative for chest pain and palpitations.    Gastrointestinal: Positive for abdominal pain, nausea and reflux. Negative for blood in stool, change in bowel habit, constipation, diarrhea, vomiting and change in bowel habit.   Endocrine: Negative for cold intolerance.   Genitourinary: Positive for dyspareunia, frequency and menstrual problem. Negative for difficulty urinating.   Musculoskeletal: Negative for arthralgias, joint swelling and myalgias.   Integumentary:  Negative for rash.   Allergic/Immunologic: Negative for environmental allergies.   Neurological: Negative for dizziness, weakness and headaches.   Psychiatric/Behavioral: Negative for agitation and confusion. The patient is nervous/anxious.          Objective:      Physical Exam  Vitals signs and nursing note reviewed.   Constitutional:       Appearance: She is well-developed.   HENT:      Head: Normocephalic and atraumatic.      Nose: No congestion or rhinorrhea.      Right Turbinates: Enlarged and swollen.      Left Turbinates: Enlarged and swollen.      Right Sinus: No maxillary sinus tenderness or frontal sinus tenderness.      Left Sinus: No maxillary sinus tenderness or frontal sinus tenderness.   Eyes:      Pupils: Pupils are equal, round, and reactive to light.   Neck:      Musculoskeletal: Normal range of motion and neck supple.   Cardiovascular:      Rate and Rhythm: Normal rate and regular rhythm.      Heart sounds: No murmur.   Pulmonary:      Effort: Pulmonary effort is normal. No respiratory distress.      Breath sounds: Normal breath sounds. No wheezing or rales.   Abdominal:      General: There is no distension.      Palpations: Abdomen is soft.      Tenderness: There is abdominal tenderness in the right lower quadrant and suprapubic area. There is guarding. There is no right CVA tenderness, left CVA tenderness or rebound. Negative signs include Phillips's sign and McBurney's sign.   Musculoskeletal: Normal range of motion.   Skin:     General: Skin is warm and dry.   Neurological:       Mental Status: She is alert and oriented to person, place, and time.      Deep Tendon Reflexes: Reflexes normal.   Psychiatric:         Behavior: Behavior normal.         Thought Content: Thought content normal.         Judgment: Judgment normal.         Recent Results (from the past 24 hour(s))   POCT urine dipstick without microscope    Collection Time: 10/26/20  8:42 AM   Result Value Ref Range    Color, UA Yellow     Spec Grav UA 1.020     pH, UA 5     WBC, UA ++     Nitrite, UA -     Protein trace     Glucose, UA normal     Ketones, UA neg     Urobilinogen, UA normal     Bilirubin ++     Blood, UA about 250     Clarity, UA Cloudy    POCT Urine Pregnancy    Collection Time: 10/26/20  8:42 AM   Result Value Ref Range    POC Preg Test, Ur Negative Negative     Acceptable Yes      Assessment:       1. Abdominal pain, unspecified abdominal location    2. Loss of smell    3. Acute cystitis without hematuria    4. History of 2019 novel coronavirus disease (COVID-19)        Plan:       Orders Placed This Encounter   Procedures    Influenza - Quadrivalent (PF)    Ambulatory referral/consult to ENT     Standing Status:   Future     Standing Expiration Date:   11/26/2021     Referral Priority:   Routine     Referral Type:   Consultation     Referral Reason:   Specialty Services Required     Requested Specialty:   Otolaryngology     Number of Visits Requested:   1    POCT urine dipstick without microscope    POCT Urine Pregnancy     -patient's UA does show some trace amounts of a possible UTI.  Will treat with Macrobid five days worth.  Have her follow-up with gyn today as well.  -patient has increased anxiety of which her Depo-Medrol injection may be a factor.  She is already on the maximum dose of Lexapro.  Rather than at an adjunct medication will have her discussed with gyn about a possible additional birth control and we will reassess after that.  -in addition to treating the UTI will also  treat patient's stomach issues with a combination of Protonix and Zantac.  Offer patient a GI referral for additional workup but she declined at this time.    -patient does have boggy turbinates and inflamed nares on nose exam.  Recommended she use a combination of Flonase and saline rinses for her symptoms.  Because these have persisted for several months offered patient a referral to ENT.  -patient is also requesting a flu shot today.

## 2020-10-26 NOTE — PROGRESS NOTES
CERVICAL gap report review. HM, chart, care everywhere, media reviewed.      HM updated with external report: PAP

## 2020-10-26 NOTE — PATIENT INSTRUCTIONS
Nitrofurantoin tablets or capsules  What is this medicine?  NITROFURANTOIN (dio strange) is an antibiotic. It is used to treat urinary tract infections.  How should I use this medicine?  Take this medicine by mouth with a glass of water. Follow the directions on the prescription label. Take this medicine with food or milk. Take your doses at regular intervals. Do not take your medicine more often than directed. Do not stop taking except on your doctor's advice.  Talk to your pediatrician regarding the use of this medicine in children. While this drug may be prescribed for selected conditions, precautions do apply.  What side effects may I notice from receiving this medicine?  Side effects that you should report to your doctor or health care professional as soon as possible:  · allergic reactions like skin rash or hives, swelling of the face, lips, or tongue  · chest pain  · cough  · difficulty breathing  · dizziness, drowsiness  · fever or infection  · joint aches or pains  · pale or blue-tinted skin  · redness, blistering, peeling or loosening of the skin, including inside the mouth  · tingling, burning, pain, or numbness in hands or feet  · unusual bleeding or bruising  · unusually weak or tired  · yellowing of eyes or skin  Side effects that usually do not require medical attention (report to your doctor or health care professional if they continue or are bothersome):  · dark urine  · diarrhea  · headache  · loss of appetite  · nausea or vomiting  · temporary hair loss  What may interact with this medicine?  · antacids containing magnesium trisilicate  · probenecid  · quinolone antibiotics like ciprofloxacin, lomefloxacin, norfloxacin and ofloxacin  · sulfinpyrazone  What if I miss a dose?  If you miss a dose, take it as soon as you can. If it is almost time for your next dose, take only that dose. Do not take double or extra doses.  Where should I keep my medicine?  Keep out of the reach of  children.  Store at room temperature between 15 and 30 degrees C (59 and 86 degrees F). Protect from light. Throw away any unused medicine after the expiration date.  What should I tell my health care provider before I take this medicine?  They need to know if you have any of these conditions:  · anemia  · diabetes  · glucose-6-phosphate dehydrogenase deficiency  · kidney disease  · liver disease  · lung disease  · other chronic illness  · an unusual or allergic reaction to nitrofurantoin, other antibiotics, other medicines, foods, dyes or preservatives  · pregnant or trying to get pregnant  · breast-feeding  What should I watch for while using this medicine?  Tell your doctor or health care professional if your symptoms do not improve or if you get new symptoms. Drink several glasses of water a day. If you are taking this medicine for a long time, visit your doctor for regular checks on your progress.  If you are diabetic, you may get a false positive result for sugar in your urine with certain brands of urine tests. Check with your doctor.  NOTE:This sheet is a summary. It may not cover all possible information. If you have questions about this medicine, talk to your doctor, pharmacist, or health care provider. Copyright© 2017 [employer]

## 2020-10-27 ENCOUNTER — TELEPHONE (OUTPATIENT)
Dept: OTOLARYNGOLOGY | Facility: CLINIC | Age: 20
End: 2020-10-27

## 2020-10-27 LAB — SARS-COV-2 RNA RESP QL NAA+PROBE: NOT DETECTED

## 2020-10-28 ENCOUNTER — OFFICE VISIT (OUTPATIENT)
Dept: OTOLARYNGOLOGY | Facility: CLINIC | Age: 20
End: 2020-10-28
Payer: OTHER GOVERNMENT

## 2020-10-28 VITALS
DIASTOLIC BLOOD PRESSURE: 99 MMHG | WEIGHT: 186.31 LBS | HEIGHT: 67 IN | BODY MASS INDEX: 29.24 KG/M2 | SYSTOLIC BLOOD PRESSURE: 136 MMHG | HEART RATE: 80 BPM

## 2020-10-28 DIAGNOSIS — R43.0 LOSS OF SMELL: Primary | ICD-10-CM

## 2020-10-28 PROCEDURE — 99204 PR OFFICE/OUTPT VISIT, NEW, LEVL IV, 45-59 MIN: ICD-10-PCS | Mod: 25,S$GLB,, | Performed by: OTOLARYNGOLOGY

## 2020-10-28 PROCEDURE — 31231 NASAL ENDOSCOPY DX: CPT | Mod: S$GLB,,, | Performed by: OTOLARYNGOLOGY

## 2020-10-28 PROCEDURE — 99204 OFFICE O/P NEW MOD 45 MIN: CPT | Mod: 25,S$GLB,, | Performed by: OTOLARYNGOLOGY

## 2020-10-28 PROCEDURE — 31231 PR NASAL ENDOSCOPY, DX: ICD-10-PCS | Mod: S$GLB,,, | Performed by: OTOLARYNGOLOGY

## 2020-10-28 NOTE — PROGRESS NOTES
Subjective:       Patient ID: Candelario Nolan is a 20 y.o. female.    Chief Complaint: Establish Care (Loss of smell x 4 months)    HPI   21 yo F presenting w altered sense of smell. 4 months ago she reports she had COVID-19 and completely lost her sense of smell and taste. Gradually her taste improved, but she still feels like she cannot smell most things. Lately has noticed a smell of burning or chemicals. Denies nosebleeds. Not a smoker.    Review of Systems   Constitutional: Negative for activity change, appetite change, chills, fever and unexpected weight change.   HENT: Negative for nasal congestion, ear discharge, ear pain, hearing loss, nosebleeds, rhinorrhea, sore throat, tinnitus, trouble swallowing and voice change.         Altered smell   Eyes: Negative for photophobia, pain and itching.   Respiratory: Negative for cough, shortness of breath, wheezing and stridor.    Cardiovascular: Negative for chest pain and palpitations.   Gastrointestinal: Negative for abdominal pain, constipation, diarrhea, nausea and vomiting.   Endocrine: Negative for cold intolerance and heat intolerance.   Genitourinary: Negative for difficulty urinating and dysuria.   Musculoskeletal: Negative for arthralgias, myalgias, neck pain and neck stiffness.   Integumentary:  Negative for pallor and rash.   Allergic/Immunologic: Negative for food allergies.   Neurological: Negative for dizziness, vertigo, seizures, syncope, facial asymmetry and headaches.   Psychiatric/Behavioral: Negative for agitation, confusion and hallucinations.         Objective:      Physical Exam  Constitutional:       Appearance: Normal appearance. She is well-developed.   HENT:      Head: Normocephalic and atraumatic.      Right Ear: Hearing, tympanic membrane, ear canal and external ear normal.      Left Ear: Hearing, tympanic membrane, ear canal and external ear normal.      Nose: Nose normal.      Comments: Procedure: Flexible fiberoptic  rhinoscopy/laryngoscopy  Bilateral nasal cavities examined with flexible scope  Nasal cavities are patent, middle and inferior turbinate healthy appearing  No visible polyps/masses/abnormalities     Mouth/Throat:      Pharynx: Uvula midline.   Eyes:      General: Lids are normal. Vision grossly intact.      Conjunctiva/sclera: Conjunctivae normal.      Pupils: Pupils are equal, round, and reactive to light.   Neck:      Musculoskeletal: Normal range of motion and neck supple.      Thyroid: No thyroid mass or thyromegaly.   Pulmonary:      Effort: Pulmonary effort is normal. No tachypnea.      Breath sounds: No stridor.   Skin:     General: Skin is warm and dry.   Neurological:      Mental Status: She is alert and oriented to person, place, and time.         Assessment:       1. Loss of smell        Plan:       19 yo with loss/altered smell after coronavirus infection    -scope exam wnl  -likely viral damage to nerve. Discussed prognosis uncertain if smell will return. Continue using nasal saline spray  -return to clinic as needed

## 2020-10-28 NOTE — LETTER
October 28, 2020      Kelechi Shook MD  [address]  [place] [address]           [place] [facility]  [address], [address]  [place] [address]  Phone: [phone]  Fax: [phone]          Patient: Candleario Nolan   MR Number: [id number]   YOB: 2000   Date of Visit: 10/28/2020       Dear Dr. Kelechi Shook:    Thank you for referring Candelario Nolan to me for evaluation. Attached you will find relevant portions of my assessment and plan of care.    If you have questions, please do not hesitate to call me. I look forward to following Candelario Nolan along with you.    Sincerely,    Nikia Valenzuela MD    Enclosure  CC:  No Recipients    If you would like to receive this communication electronically, please contact [email or web address] or [phone] to request more information on [employer] Link access.    For providers and/or their staff who would like to refer a patient to [facility], please contact us through our one-stop-shop provider referral line, [facility], at [phone].    If you feel you have received this communication in error or would no longer like to receive these types of communications, please e-mail [email or web address]

## 2021-04-12 ENCOUNTER — TELEPHONE (OUTPATIENT)
Dept: FAMILY MEDICINE | Facility: CLINIC | Age: 21
End: 2021-04-12

## 2021-05-12 ENCOUNTER — PATIENT MESSAGE (OUTPATIENT)
Dept: RESEARCH | Facility: HOSPITAL | Age: 21
End: 2021-05-12

## 2021-07-08 RX ORDER — ESCITALOPRAM OXALATE 20 MG/1
20 TABLET ORAL DAILY
Qty: 90 TABLET | Refills: 3 | Status: SHIPPED | OUTPATIENT
Start: 2021-07-08 | End: 2022-04-12

## 2021-12-27 ENCOUNTER — LAB VISIT (OUTPATIENT)
Dept: PRIMARY CARE CLINIC | Facility: OTHER | Age: 21
End: 2021-12-27
Attending: INTERNAL MEDICINE
Payer: OTHER GOVERNMENT

## 2021-12-27 DIAGNOSIS — Z20.822 ENCOUNTER FOR LABORATORY TESTING FOR COVID-19 VIRUS: ICD-10-CM

## 2021-12-27 PROCEDURE — U0003 INFECTIOUS AGENT DETECTION BY NUCLEIC ACID (DNA OR RNA); SEVERE ACUTE RESPIRATORY SYNDROME CORONAVIRUS 2 (SARS-COV-2) (CORONAVIRUS DISEASE [COVID-19]), AMPLIFIED PROBE TECHNIQUE, MAKING USE OF HIGH THROUGHPUT TECHNOLOGIES AS DESCRIBED BY CMS-2020-01-R: HCPCS | Performed by: INTERNAL MEDICINE

## 2021-12-28 ENCOUNTER — PATIENT MESSAGE (OUTPATIENT)
Dept: ADMINISTRATIVE | Facility: OTHER | Age: 21
End: 2021-12-28
Payer: OTHER GOVERNMENT

## 2021-12-29 ENCOUNTER — TELEPHONE (OUTPATIENT)
Dept: FAMILY MEDICINE | Facility: CLINIC | Age: 21
End: 2021-12-29
Payer: OTHER GOVERNMENT

## 2021-12-29 DIAGNOSIS — R11.0 NAUSEA: Primary | ICD-10-CM

## 2021-12-29 LAB
SARS-COV-2 RNA RESP QL NAA+PROBE: DETECTED
SARS-COV-2- CYCLE NUMBER: 16

## 2021-12-29 RX ORDER — ONDANSETRON 8 MG/1
8 TABLET, ORALLY DISINTEGRATING ORAL EVERY 8 HOURS PRN
Qty: 30 TABLET | Refills: 0 | Status: SHIPPED | OUTPATIENT
Start: 2021-12-29 | End: 2022-12-05 | Stop reason: ALTCHOICE

## 2022-04-12 ENCOUNTER — PATIENT OUTREACH (OUTPATIENT)
Dept: ADMINISTRATIVE | Facility: HOSPITAL | Age: 22
End: 2022-04-12
Payer: OTHER GOVERNMENT

## 2022-04-12 ENCOUNTER — OFFICE VISIT (OUTPATIENT)
Dept: FAMILY MEDICINE | Facility: CLINIC | Age: 22
End: 2022-04-12
Payer: OTHER GOVERNMENT

## 2022-04-12 VITALS
HEART RATE: 70 BPM | WEIGHT: 224.44 LBS | RESPIRATION RATE: 18 BRPM | BODY MASS INDEX: 35.22 KG/M2 | HEIGHT: 67 IN | TEMPERATURE: 98 F | OXYGEN SATURATION: 98 % | DIASTOLIC BLOOD PRESSURE: 74 MMHG | SYSTOLIC BLOOD PRESSURE: 118 MMHG

## 2022-04-12 DIAGNOSIS — F32.A ANXIETY AND DEPRESSION: Primary | ICD-10-CM

## 2022-04-12 DIAGNOSIS — Z11.59 NEED FOR HEPATITIS C SCREENING TEST: ICD-10-CM

## 2022-04-12 DIAGNOSIS — Z00.00 LABORATORY EXAMINATION ORDERED AS PART OF A ROUTINE GENERAL MEDICAL EXAMINATION: ICD-10-CM

## 2022-04-12 DIAGNOSIS — Z11.4 ENCOUNTER FOR SCREENING FOR HIV: ICD-10-CM

## 2022-04-12 DIAGNOSIS — R21 RASH: ICD-10-CM

## 2022-04-12 DIAGNOSIS — F41.9 ANXIETY AND DEPRESSION: Primary | ICD-10-CM

## 2022-04-12 PROCEDURE — 99213 PR OFFICE/OUTPT VISIT, EST, LEVL III, 20-29 MIN: ICD-10-PCS | Mod: S$GLB,,, | Performed by: PHYSICIAN ASSISTANT

## 2022-04-12 PROCEDURE — 99999 PR PBB SHADOW E&M-EST. PATIENT-LVL IV: ICD-10-PCS | Mod: PBBFAC,,, | Performed by: PHYSICIAN ASSISTANT

## 2022-04-12 PROCEDURE — 99999 PR PBB SHADOW E&M-EST. PATIENT-LVL IV: CPT | Mod: PBBFAC,,, | Performed by: PHYSICIAN ASSISTANT

## 2022-04-12 PROCEDURE — 99213 OFFICE O/P EST LOW 20 MIN: CPT | Mod: S$GLB,,, | Performed by: PHYSICIAN ASSISTANT

## 2022-04-12 RX ORDER — BUPROPION HYDROCHLORIDE 150 MG/1
150 TABLET ORAL DAILY
Qty: 30 TABLET | Refills: 11 | Status: SHIPPED | OUTPATIENT
Start: 2022-04-12 | End: 2022-04-27

## 2022-04-12 RX ORDER — CLOTRIMAZOLE AND BETAMETHASONE DIPROPIONATE 10; .64 MG/G; MG/G
CREAM TOPICAL 2 TIMES DAILY
Qty: 45 G | Refills: 0 | Status: SHIPPED | OUTPATIENT
Start: 2022-04-12

## 2022-04-12 NOTE — LETTER
AUTHORIZATION FOR RELEASE OF   CONFIDENTIAL INFORMATION    Dear Dr Whiteside,    We are seeing Candelario Nolan, date of birth 2000, in the clinic at [facility]. Kelechi Shook MD is the patient's PCP. Candelario Nolan has an outstanding lab/procedure at the time we reviewed her chart. In order to help keep her health information updated, she has authorized us to request the following medical record(s):        (  )  MAMMOGRAM                                      (  )  COLONOSCOPY      ( X )  PAP SMEAR                                          (  )  OUTSIDE LAB RESULTS     (  )  DEXA SCAN                                          (  )  EYE EXAM            (  )  FOOT EXAM                                          (  )  ENTIRE RECORD     (  )  OUTSIDE IMMUNIZATIONS                 (  )  _______________         Please fax records to [facility], Ryan Rhodes, MD at [phone]    Thanks so much and have a great day!    Sangita Gill LPN [facility]  [address] [place] [address]  P- [phone]   [phone]          Patient Name: Candelario Nolan  : 2000  Patient Phone #: [phone]

## 2022-04-12 NOTE — PROGRESS NOTES
Subjective:       Patient ID: Candelario Nolan is a 21 y.o. female.    Chief Complaint: Anxiety    Patient presents with complaints of uncontrolled anxiety.  She states she has been on Lexapro for the last 3-4 years and while used to work for her has not worked recently.  She states she has had a lot of events happened in her life that are stressful including the death of a friend and a recent break-up.  She states she is anxious most of the time and has intrusive thoughts.  She denies any suicidal or homicidal ideations.  She states these intrusive thoughts are bothersome to her she does not want to have them.  They cause her to start worrying.  The only medication she has ever taken is Lexapro.    Patient also complains of a rash under both arms in the axillary region from a deodorant.  She stopped using the topical and started using the spray but the rash has persisted.  She has had trouble with this brand of urine in the past.  She is unable to resolve the rash on her own.    Review of Systems   Constitutional: Negative for activity change, appetite change, fatigue, fever and unexpected weight change.   HENT: Negative for nasal congestion, dental problem, hearing loss, nosebleeds, postnasal drip, rhinorrhea, sinus pressure/congestion and trouble swallowing.    Eyes: Negative for photophobia, pain, discharge and visual disturbance.   Respiratory: Negative for cough, chest tightness, shortness of breath and wheezing.    Cardiovascular: Negative for chest pain, palpitations and leg swelling.   Gastrointestinal: Negative for abdominal pain, blood in stool, constipation, diarrhea, nausea and vomiting.   Genitourinary: Negative for difficulty urinating, dyspareunia, dysuria, frequency, hematuria, menstrual problem, pelvic pain, vaginal bleeding, vaginal discharge and vaginal pain.   Musculoskeletal: Negative for arthralgias, back pain, gait problem, joint swelling and neck pain.   Integumentary:  Positive for color change  and rash.   Neurological: Negative for dizziness, tremors, seizures, weakness, light-headedness, numbness and headaches.   Hematological: Does not bruise/bleed easily.   Psychiatric/Behavioral: Positive for dysphoric mood. Negative for agitation, behavioral problems, confusion, decreased concentration, hallucinations, self-injury, sleep disturbance and suicidal ideas. The patient is nervous/anxious. The patient is not hyperactive.          Objective:      Physical Exam  Constitutional:       Appearance: She is well-developed.   HENT:      Head: Normocephalic and atraumatic.   Eyes:      Conjunctiva/sclera: Conjunctivae normal.      Pupils: Pupils are equal, round, and reactive to light.   Neck:      Vascular: No JVD.   Cardiovascular:      Rate and Rhythm: Normal rate and regular rhythm.      Heart sounds: No murmur heard.    No friction rub. No gallop.   Pulmonary:      Effort: Pulmonary effort is normal. No respiratory distress.      Breath sounds: Normal breath sounds. No wheezing or rales.   Musculoskeletal:      Cervical back: Normal range of motion and neck supple.   Skin:     General: Skin is warm and dry.      Comments: Erythema under both axilla that is consistent with possible yeast dermatitis is with secondary contact dermatitis no broken skin or sign of infection   Neurological:      Mental Status: She is alert and oriented to person, place, and time.   Psychiatric:         Mood and Affect: Mood normal.         Behavior: Behavior normal.         Thought Content: Thought content normal.         Judgment: Judgment normal.         Assessment:       Problem List Items Addressed This Visit     Anxiety and depression - Primary    Relevant Medications    buPROPion (WELLBUTRIN XL) 150 MG TB24 tablet    Other Relevant Orders    TSH      Other Visit Diagnoses     Need for hepatitis C screening test        Relevant Orders    Hepatitis C Antibody    Encounter for screening for HIV        Relevant Orders    HIV 1/2  Ag/Ab (4th Gen)    Laboratory examination ordered as part of a routine general medical examination        Relevant Orders    CBC Auto Differential    Comprehensive Metabolic Panel    Lipid Panel    TSH    Rash        Uncontrolled, stop topical deodorant, use cream and use spray sensitive skin deodorant    Relevant Medications    clotrimazole-betamethasone 1-0.05% (LOTRISONE) cream          Plan:       Candelario was seen today for anxiety.    Diagnoses and all orders for this visit:    Anxiety and depression  Comments:  Uncontrolled, stop Lexapro start Wellbutrin 150 mg and follow up virtually in 2 weeks  Orders:  -     buPROPion (WELLBUTRIN XL) 150 MG TB24 tablet; Take 1 tablet (150 mg total) by mouth once daily.  -     TSH; Future    Need for hepatitis C screening test  -     Hepatitis C Antibody; Future    Encounter for screening for HIV  -     HIV 1/2 Ag/Ab (4th Gen); Future    Laboratory examination ordered as part of a routine general medical examination  -     CBC Auto Differential; Future  -     Comprehensive Metabolic Panel; Future  -     Lipid Panel; Future  -     TSH; Future    Rash  Comments:  Uncontrolled, stop topical deodorant, use cream and use spray sensitive skin deodorant  Orders:  -     clotrimazole-betamethasone 1-0.05% (LOTRISONE) cream; Apply topically 2 (two) times daily.    Patient to follow-up in 2 weeks virtually for a med check.  She will let us know of any problems with the medication before that time

## 2022-04-19 ENCOUNTER — PATIENT MESSAGE (OUTPATIENT)
Dept: FAMILY MEDICINE | Facility: CLINIC | Age: 22
End: 2022-04-19
Payer: OTHER GOVERNMENT

## 2022-04-19 NOTE — TELEPHONE ENCOUNTER
I called patient and spoke to her on the phone.  She stopped Lexapro 6 days ago and started Wellbutrin 5 days ago.  She states she stopped vaping last night and feels slightly better today but is still having some nausea and dizziness today.  We discussed this is likely from the start of Wellbutrin but if symptoms continue to worsen I wanted her to stop the medication.  As far as taking Wellbutrin while vaping being dangerous I could not find any information on that and told patient Wellbutrin is frequently used for smoking cessation and vaping itself is very dangerous and she would benefit by stopping vaping.  She expressed understanding and will let us know how she does.

## 2022-04-27 ENCOUNTER — OFFICE VISIT (OUTPATIENT)
Dept: FAMILY MEDICINE | Facility: CLINIC | Age: 22
End: 2022-04-27
Payer: OTHER GOVERNMENT

## 2022-04-27 DIAGNOSIS — F41.9 ANXIETY: Primary | ICD-10-CM

## 2022-04-27 PROCEDURE — 99213 OFFICE O/P EST LOW 20 MIN: CPT | Mod: 95,,, | Performed by: PHYSICIAN ASSISTANT

## 2022-04-27 PROCEDURE — 99213 PR OFFICE/OUTPT VISIT, EST, LEVL III, 20-29 MIN: ICD-10-PCS | Mod: 95,,, | Performed by: PHYSICIAN ASSISTANT

## 2022-04-27 RX ORDER — DULOXETIN HYDROCHLORIDE 30 MG/1
30 CAPSULE, DELAYED RELEASE ORAL DAILY
Qty: 30 CAPSULE | Refills: 11 | Status: SHIPPED | OUTPATIENT
Start: 2022-04-27 | End: 2023-04-18

## 2022-04-27 NOTE — PROGRESS NOTES
The patient location is: work  The chief complaint leading to consultation is: follow up anxiety    Visit type: audiovisual    Face to Face time with patient: 10 minutes of total time spent on the encounter, which includes face to face time and non-face to face time preparing to see the patient (eg, review of tests), Obtaining and/or reviewing separately obtained history, Documenting clinical information in the electronic or other health record, Independently interpreting results (not separately reported) and communicating results to the patient/family/caregiver, or Care coordination (not separately reported).         Each patient to whom he or she provides medical services by telemedicine is:  (1) informed of the relationship between the physician and patient and the respective role of any other health care provider with respect to management of the patient; and (2) notified that he or she may decline to receive medical services by telemedicine and may withdraw from such care at any time.    Notes:  Patient was unable to tolerate the Wellbutrin that was prescribed 2 weeks ago.  She was having severe side effects and feelings of near-syncope.  She states when she did stop the medication and restarted the Lexapro the symptoms resolved.  She is still having somewhat of a headache but is back on her Lexapro 20. She is still having symptoms of anxiety and wishes to try any medication.  She denies any suicidal or homicidal ideations.    Candelario was seen today for anxiety.    Diagnoses and all orders for this visit:    Anxiety  -     DULoxetine (CYMBALTA) 30 MG capsule; Take 1 capsule (30 mg total) by mouth once daily.    We are going to continue Lexapro 10 while patients are susceptible to 30 we will follow-up in 2 weeks.  Patient to let us know of any severe side effects for stopping the medication just like she did the Wellbutrin.  She expressed understanding.  After we get her established on Cymbalta, if this is the  right medication, we will wean her off Lexapro.    Answers for HPI/ROS submitted by the patient on 4/27/2022  activity change: No  hearing loss: No  trouble swallowing: No  eye discharge: No  chest tightness: No  wheezing: No  chest pain: No  palpitations: No  constipation: No  vomiting: No  diarrhea: No  difficulty urinating: No  hematuria: No  headaches: No  dysphoric mood: No

## 2022-05-11 ENCOUNTER — PATIENT MESSAGE (OUTPATIENT)
Dept: FAMILY MEDICINE | Facility: CLINIC | Age: 22
End: 2022-05-11
Payer: OTHER GOVERNMENT

## 2022-06-03 ENCOUNTER — OCCUPATIONAL HEALTH (OUTPATIENT)
Dept: URGENT CARE | Facility: CLINIC | Age: 22
End: 2022-06-03
Payer: OTHER GOVERNMENT

## 2022-06-03 DIAGNOSIS — Z13.9 ENCOUNTER FOR SCREENING: Primary | ICD-10-CM

## 2022-06-03 LAB — COLLECTION ONLY: NORMAL

## 2022-06-03 PROCEDURE — 80305 DRUG TEST PRSMV DIR OPT OBS: CPT | Mod: S$GLB,,, | Performed by: NURSE PRACTITIONER

## 2022-06-03 PROCEDURE — 80305 OOH NON-DOT DRUG SCREEN: ICD-10-PCS | Mod: S$GLB,,, | Performed by: NURSE PRACTITIONER

## 2022-06-16 ENCOUNTER — CLINICAL SUPPORT (OUTPATIENT)
Dept: SMOKING CESSATION | Facility: CLINIC | Age: 22
End: 2022-06-16
Payer: COMMERCIAL

## 2022-06-16 DIAGNOSIS — F17.200 NICOTINE DEPENDENCE: ICD-10-CM

## 2022-06-16 DIAGNOSIS — F17.200 NICOTINE DEPENDENCE: Primary | ICD-10-CM

## 2022-06-16 PROCEDURE — 99999 PR PBB SHADOW E&M-EST. PATIENT-LVL I: ICD-10-PCS | Mod: PBBFAC,,,

## 2022-06-16 PROCEDURE — 99404 PREV MED CNSL INDIV APPRX 60: CPT | Mod: S$GLB,,,

## 2022-06-16 PROCEDURE — 99404 PR PREVENT COUNSEL,INDIV,60 MIN: ICD-10-PCS | Mod: S$GLB,,,

## 2022-06-16 PROCEDURE — 99999 PR PBB SHADOW E&M-EST. PATIENT-LVL I: CPT | Mod: PBBFAC,,,

## 2022-06-16 RX ORDER — VARENICLINE TARTRATE 1 MG/1
TABLET, FILM COATED ORAL
Qty: 56 TABLET | Refills: 0 | Status: SHIPPED | OUTPATIENT
Start: 2022-06-16 | End: 2022-06-20

## 2022-06-16 NOTE — Clinical Note
Patient was seen in clinic today for tobacco cessation follow up. She has been using electronic cigarette for 5 years and she threw away her device today. She is ready to quit for health improvements. We discussed and reviewed: self awareness, triggers, habitual learned behaviors, treatment options, and follow up. She is motivated to quit for better health, as prepares for [employer]. She will begin her tobacco cessation regimen of Varenicline 1 mg BID once received from the [facility].

## 2022-06-20 RX ORDER — VARENICLINE TARTRATE 1 MG/1
TABLET, FILM COATED ORAL
Qty: 90 TABLET | Refills: 2 | Status: SHIPPED | OUTPATIENT
Start: 2022-06-20 | End: 2022-08-25 | Stop reason: SDUPTHER

## 2022-08-25 ENCOUNTER — CLINICAL SUPPORT (OUTPATIENT)
Dept: SMOKING CESSATION | Facility: CLINIC | Age: 22
End: 2022-08-25

## 2022-08-25 DIAGNOSIS — F17.210 HEAVY CIGARETTE SMOKER (20-39 PER DAY): Primary | ICD-10-CM

## 2022-08-25 PROCEDURE — 99999 PR PBB SHADOW E&M-EST. PATIENT-LVL II: CPT | Mod: PBBFAC,,,

## 2022-08-25 PROCEDURE — 99404 PR PREVENT COUNSEL,INDIV,60 MIN: ICD-10-PCS | Mod: S$GLB,,,

## 2022-08-25 PROCEDURE — 99999 PR PBB SHADOW E&M-EST. PATIENT-LVL II: ICD-10-PCS | Mod: PBBFAC,,,

## 2022-08-25 PROCEDURE — 99404 PREV MED CNSL INDIV APPRX 60: CPT | Mod: S$GLB,,,

## 2022-08-25 RX ORDER — VARENICLINE TARTRATE 1 MG/1
TABLET, FILM COATED ORAL
Qty: 52 TABLET | Refills: 0 | OUTPATIENT
Start: 2022-08-25 | End: 2022-09-19 | Stop reason: SDUPTHER

## 2022-08-25 RX ORDER — IBUPROFEN 200 MG
1 TABLET ORAL DAILY
Qty: 28 PATCH | Refills: 0 | Status: SHIPPED | OUTPATIENT
Start: 2022-08-25 | End: 2022-09-30

## 2022-08-25 RX ORDER — VARENICLINE TARTRATE 1 MG/1
1 TABLET, FILM COATED ORAL 2 TIMES DAILY
Qty: 52 TABLET | Refills: 0 | Status: SHIPPED | OUTPATIENT
Start: 2022-08-25 | End: 2022-08-25

## 2022-08-25 NOTE — Clinical Note
Patient was seen for tobacco cessation intake assesment.  Patient is vaping 40 times daily or more.  Patient will begin on starter dose Chantix and 21 mg nicotine patch daily. We discussed behavior change, cue, triggers, self awarness, and reduction/treatment plan.  Patient has agreed to bi weekly follow up.

## 2022-09-15 ENCOUNTER — CLINICAL SUPPORT (OUTPATIENT)
Dept: SMOKING CESSATION | Facility: CLINIC | Age: 22
End: 2022-09-15

## 2022-09-15 DIAGNOSIS — F17.210 MODERATE CIGARETTE SMOKER (10-19 PER DAY): Primary | ICD-10-CM

## 2022-09-15 DIAGNOSIS — F17.210 HEAVY CIGARETTE SMOKER (20-39 PER DAY): ICD-10-CM

## 2022-09-15 PROCEDURE — 99999 PR PBB SHADOW E&M-EST. PATIENT-LVL I: ICD-10-PCS | Mod: PBBFAC,,,

## 2022-09-15 PROCEDURE — 99406 BEHAV CHNG SMOKING 3-10 MIN: CPT | Mod: S$GLB,,,

## 2022-09-15 PROCEDURE — 99999 PR PBB SHADOW E&M-EST. PATIENT-LVL I: CPT | Mod: PBBFAC,,,

## 2022-09-15 PROCEDURE — 99406 PR TOBACCO USE CESSATION INTERMEDIATE 3-10 MINUTES: ICD-10-PCS | Mod: S$GLB,,,

## 2022-09-19 RX ORDER — VARENICLINE TARTRATE 1 MG/1
TABLET, FILM COATED ORAL
Qty: 52 TABLET | Refills: 0 | OUTPATIENT
Start: 2022-09-19 | End: 2023-01-18 | Stop reason: SDUPTHER

## 2022-10-06 ENCOUNTER — CLINICAL SUPPORT (OUTPATIENT)
Dept: SMOKING CESSATION | Facility: CLINIC | Age: 22
End: 2022-10-06
Payer: COMMERCIAL

## 2022-10-06 DIAGNOSIS — F17.210 HEAVY CIGARETTE SMOKER (20-39 PER DAY): ICD-10-CM

## 2022-10-06 DIAGNOSIS — F17.210 LIGHT CIGARETTE SMOKER (1-9 CIGS/DAY): Primary | ICD-10-CM

## 2022-10-06 PROCEDURE — 99999 PR PBB SHADOW E&M-EST. PATIENT-LVL I: CPT | Mod: PBBFAC,,,

## 2022-10-06 PROCEDURE — 99407 PR TOBACCO USE CESSATION INTENSIVE >10 MINUTES: ICD-10-PCS | Mod: S$GLB,,,

## 2022-10-06 PROCEDURE — 99407 BEHAV CHNG SMOKING > 10 MIN: CPT | Mod: S$GLB,,,

## 2022-10-06 PROCEDURE — 99999 PR PBB SHADOW E&M-EST. PATIENT-LVL I: ICD-10-PCS | Mod: PBBFAC,,,

## 2022-10-06 RX ORDER — IBUPROFEN 200 MG
1 TABLET ORAL DAILY
Qty: 28 PATCH | Refills: 0 | Status: SHIPPED | OUTPATIENT
Start: 2022-10-06 | End: 2022-12-05 | Stop reason: SDUPTHER

## 2022-11-01 ENCOUNTER — CLINICAL SUPPORT (OUTPATIENT)
Dept: SMOKING CESSATION | Facility: CLINIC | Age: 22
End: 2022-11-01

## 2022-11-01 DIAGNOSIS — F17.210 LIGHT CIGARETTE SMOKER (1-9 CIGS/DAY): Primary | ICD-10-CM

## 2022-11-01 PROCEDURE — 99999 PR PBB SHADOW E&M-EST. PATIENT-LVL I: CPT | Mod: PBBFAC,,,

## 2022-11-01 PROCEDURE — 99403 PR PREVENT COUNSEL,INDIV,45 MIN: ICD-10-PCS | Mod: S$GLB,,,

## 2022-11-01 PROCEDURE — 99999 PR PBB SHADOW E&M-EST. PATIENT-LVL I: ICD-10-PCS | Mod: PBBFAC,,,

## 2022-11-01 PROCEDURE — 99403 PREV MED CNSL INDIV APPRX 45: CPT | Mod: S$GLB,,,

## 2022-11-01 NOTE — Clinical Note
Patient was seen for in office follow up.  Patient reports relapsing after being around other smokers.  Patient is vaping 10 times a day or more.  Patient will attempt another quit next week.  Patient will resume use Chantix.  Explained to patient how to resume use.  We discussed and reviewed: triggers, managing stress & anxiety, support system, continued momentum, setting attainable goals, mindset & motivation, treatment plan and accountability. The patient denies any abnormal behavioral or mental changes at this time. The patient will continue with  therapy sessions and medication monitoring by CTTS. Prescribed medication management will be by physician.

## 2022-11-16 ENCOUNTER — CLINICAL SUPPORT (OUTPATIENT)
Dept: SMOKING CESSATION | Facility: CLINIC | Age: 22
End: 2022-11-16

## 2022-11-16 DIAGNOSIS — F17.210 LIGHT CIGARETTE SMOKER (1-9 CIGS/DAY): Primary | ICD-10-CM

## 2022-11-16 DIAGNOSIS — F17.210 HEAVY CIGARETTE SMOKER (20-39 PER DAY): ICD-10-CM

## 2022-11-16 PROCEDURE — 99999 PR PBB SHADOW E&M-EST. PATIENT-LVL I: CPT | Mod: PBBFAC,,,

## 2022-11-16 PROCEDURE — 99403 PR PREVENT COUNSEL,INDIV,45 MIN: ICD-10-PCS | Mod: S$GLB,,,

## 2022-11-16 PROCEDURE — 99999 PR PBB SHADOW E&M-EST. PATIENT-LVL I: ICD-10-PCS | Mod: PBBFAC,,,

## 2022-11-16 PROCEDURE — 99403 PREV MED CNSL INDIV APPRX 45: CPT | Mod: S$GLB,,,

## 2022-11-16 NOTE — PROGRESS NOTES
Individual Follow-Up Form    11/16/2022    Quit Date:     Clinical Status of Patient: Outpatient      Continuing Medication: yes  Chantix    Other Medications: nicotine patch   Target Symptoms: Withdrawal and medication side effects. The following were  rated moderate (3) to severe (4) on TCRS:  Moderate (3): none  Severe (4): none    Comments: Patient is vaping  less than 10 times daily.  Patient will attempt a quit by next visit. Patient was commended on her success thus far. Patient remains on prescribed tobacco cessation medication regimen of 21 mg patch without any negative side effects at this time. Completion of TCRS (Tobacco Cessation Rating Scale) reviewed strategies, habitual behavior, stress, and high risk situations. Introduced stress with addition interventions, SOLVE, relaxation with interventions, nutrition, exercise, weight gain, and the importance of rewarding oneself for accomplishments toward becoming tobacco free. Open discussion of all items with interventions.  The patient denies any abnormal behavioral or mental changes at this time. The patient will continue with  therapy sessions and medication monitoring by CTTS. Prescribed medication management will be by physician.      Diagnosis: F17.210    Next Visit: 2 weeks

## 2022-12-05 ENCOUNTER — OFFICE VISIT (OUTPATIENT)
Dept: FAMILY MEDICINE | Facility: CLINIC | Age: 22
End: 2022-12-05
Payer: OTHER GOVERNMENT

## 2022-12-05 ENCOUNTER — LAB VISIT (OUTPATIENT)
Dept: LAB | Facility: HOSPITAL | Age: 22
End: 2022-12-05
Attending: NURSE PRACTITIONER
Payer: OTHER GOVERNMENT

## 2022-12-05 VITALS
WEIGHT: 226.88 LBS | OXYGEN SATURATION: 98 % | TEMPERATURE: 99 F | BODY MASS INDEX: 35.61 KG/M2 | SYSTOLIC BLOOD PRESSURE: 118 MMHG | HEART RATE: 74 BPM | HEIGHT: 67 IN | DIASTOLIC BLOOD PRESSURE: 72 MMHG

## 2022-12-05 DIAGNOSIS — R11.0 NAUSEA: ICD-10-CM

## 2022-12-05 DIAGNOSIS — R50.9 FEVER, UNSPECIFIED FEVER CAUSE: Primary | ICD-10-CM

## 2022-12-05 DIAGNOSIS — R61 NIGHT SWEAT: ICD-10-CM

## 2022-12-05 DIAGNOSIS — R00.0 TACHYCARDIA: ICD-10-CM

## 2022-12-05 DIAGNOSIS — R50.9 FEVER, UNSPECIFIED FEVER CAUSE: ICD-10-CM

## 2022-12-05 LAB
ALBUMIN SERPL BCP-MCNC: 4.1 G/DL (ref 3.5–5.2)
ALP SERPL-CCNC: 53 U/L (ref 55–135)
ALT SERPL W/O P-5'-P-CCNC: 23 U/L (ref 10–44)
ANION GAP SERPL CALC-SCNC: 11 MMOL/L (ref 8–16)
AST SERPL-CCNC: 24 U/L (ref 10–40)
B-HCG UR QL: NEGATIVE
BILIRUB SERPL-MCNC: 0.4 MG/DL (ref 0.1–1)
BILIRUB SERPL-MCNC: NEGATIVE MG/DL
BLOOD URINE, POC: NEGATIVE
BUN SERPL-MCNC: 12 MG/DL (ref 6–20)
CALCIUM SERPL-MCNC: 10.3 MG/DL (ref 8.7–10.5)
CHLORIDE SERPL-SCNC: 103 MMOL/L (ref 95–110)
CLARITY, POC UA: CLEAR
CO2 SERPL-SCNC: 24 MMOL/L (ref 23–29)
COLOR, POC UA: YELLOW
CREAT SERPL-MCNC: 0.8 MG/DL (ref 0.5–1.4)
CTP QC/QA: YES
EST. GFR  (NO RACE VARIABLE): >60 ML/MIN/1.73 M^2
GLUCOSE SERPL-MCNC: 85 MG/DL (ref 70–110)
GLUCOSE UR QL STRIP: NEGATIVE
KETONES UR QL STRIP: NEGATIVE
LEUKOCYTE ESTERASE URINE, POC: NORMAL
NITRITE, POC UA: NEGATIVE
PH, POC UA: 6
POTASSIUM SERPL-SCNC: 4 MMOL/L (ref 3.5–5.1)
PROT SERPL-MCNC: 7.2 G/DL (ref 6–8.4)
PROTEIN, POC: NEGATIVE
SODIUM SERPL-SCNC: 138 MMOL/L (ref 136–145)
SPECIFIC GRAVITY, POC UA: 1.02
UROBILINOGEN, POC UA: 0.2

## 2022-12-05 PROCEDURE — 99214 OFFICE O/P EST MOD 30 MIN: CPT | Mod: S$GLB,,, | Performed by: NURSE PRACTITIONER

## 2022-12-05 PROCEDURE — 81025 URINE PREGNANCY TEST: CPT | Mod: S$GLB,,, | Performed by: NURSE PRACTITIONER

## 2022-12-05 PROCEDURE — 99999 PR PBB SHADOW E&M-EST. PATIENT-LVL III: CPT | Mod: PBBFAC,,, | Performed by: NURSE PRACTITIONER

## 2022-12-05 PROCEDURE — 99214 PR OFFICE/OUTPT VISIT, EST, LEVL IV, 30-39 MIN: ICD-10-PCS | Mod: S$GLB,,, | Performed by: NURSE PRACTITIONER

## 2022-12-05 PROCEDURE — 99999 PR PBB SHADOW E&M-EST. PATIENT-LVL III: ICD-10-PCS | Mod: PBBFAC,,, | Performed by: NURSE PRACTITIONER

## 2022-12-05 PROCEDURE — 81025 POCT URINE PREGNANCY: ICD-10-PCS | Mod: S$GLB,,, | Performed by: NURSE PRACTITIONER

## 2022-12-05 PROCEDURE — 80053 COMPREHEN METABOLIC PANEL: CPT | Performed by: NURSE PRACTITIONER

## 2022-12-05 PROCEDURE — 93010 EKG 12-LEAD: ICD-10-PCS | Mod: S$GLB,,, | Performed by: INTERNAL MEDICINE

## 2022-12-05 PROCEDURE — 93005 ELECTROCARDIOGRAM TRACING: CPT | Mod: S$GLB,,, | Performed by: NURSE PRACTITIONER

## 2022-12-05 PROCEDURE — 36415 COLL VENOUS BLD VENIPUNCTURE: CPT | Mod: PO | Performed by: NURSE PRACTITIONER

## 2022-12-05 PROCEDURE — 81002 POCT URINE DIPSTICK WITHOUT MICROSCOPE: ICD-10-PCS | Mod: S$GLB,,, | Performed by: NURSE PRACTITIONER

## 2022-12-05 PROCEDURE — 93010 ELECTROCARDIOGRAM REPORT: CPT | Mod: S$GLB,,, | Performed by: INTERNAL MEDICINE

## 2022-12-05 PROCEDURE — 86580 TB INTRADERMAL TEST: CPT | Mod: S$GLB,,, | Performed by: NURSE PRACTITIONER

## 2022-12-05 PROCEDURE — 93005 EKG 12-LEAD: ICD-10-PCS | Mod: S$GLB,,, | Performed by: NURSE PRACTITIONER

## 2022-12-05 PROCEDURE — 81002 URINALYSIS NONAUTO W/O SCOPE: CPT | Mod: S$GLB,,, | Performed by: NURSE PRACTITIONER

## 2022-12-05 PROCEDURE — 86580 POCT TB SKIN TEST: ICD-10-PCS | Mod: S$GLB,,, | Performed by: NURSE PRACTITIONER

## 2022-12-05 PROCEDURE — 85025 COMPLETE CBC W/AUTO DIFF WBC: CPT | Performed by: NURSE PRACTITIONER

## 2022-12-05 RX ORDER — IBUPROFEN 200 MG
1 TABLET ORAL DAILY
Qty: 28 PATCH | Refills: 0 | Status: SHIPPED | OUTPATIENT
Start: 2022-12-05 | End: 2023-01-18 | Stop reason: SDUPTHER

## 2022-12-05 NOTE — PATIENT INSTRUCTIONS
Broderick Palomino,     If you are due for any health screening(s) below please notify me so we can arrange them to be ordered and scheduled to maintain your health. Most healthy patients complete it. Don't lose out on improving your health.     All of your core healthy metrics are met.

## 2022-12-05 NOTE — PROGRESS NOTES
This dictation has been generated using Modal Fluency Dictation some phonetic errors may occur. Please contact author for clarification if needed.     Problem List Items Addressed This Visit    None  Visit Diagnoses       Fever, unspecified fever cause    -  Primary    Relevant Orders    CBC Auto Differential    POCT TB Skin Test Read (Completed)    IN OFFICE EKG 12-LEAD (to [place])    Urinalysis    POCT Urine Pregnancy (Completed)    Comprehensive Metabolic Panel    POCT URINE DIPSTICK WITHOUT MICROSCOPE (Completed)    Night sweat        Nausea        Relevant Orders    POCT Urine Pregnancy (Completed)    Comprehensive Metabolic Panel    Tachycardia        Relevant Orders    Stress Echo Which stress agent will be used? Treadmill Exercise; Color Flow Doppler? No    Holter monitor - 24 hour            Orders Placed This Encounter    CBC Auto Differential    Urinalysis    Comprehensive Metabolic Panel    Holter monitor - 24 hour    POCT TB Skin Test Read    POCT Urine Pregnancy    POCT URINE DIPSTICK WITHOUT MICROSCOPE    IN OFFICE EKG 12-LEAD (to [place])    Stress Echo Which stress agent will be used? Treadmill Exercise; Color Flow Doppler? No     Fever night sweats nausea tachycardia check labs as above.  Rule out TB.  Check EKG I reviewed the images no AMI.  No atrial fibrillation.  Right bundle branch block proceed with stress echo and Holter.  Urinalysis and POCT urine pregnancy acceptable.    Follow up in about 15 days (around 12/20/2022).    ________________________________________________________________  ________________________________________________________________      Chief Complaint   Patient presents with    Fever    Nausea     History of present illness  This 22 y.o. presents today for complaint of fever for greater than a week.  Denies any cold symptoms.  Did have some night sweats.  Patient denies  symptoms.  No weight loss.  No cough or shortness of breath.  Nausea without vomiting or diarrhea.  No  rash.    Past Medical History:   Diagnosis Date    Hypertension        Past Surgical History:   Procedure Laterality Date    FOOT SURGERY Right     fracture repair       Family History   Problem Relation Age of Onset    Asthma Mother     Allergies Mother     Thyroid disease Father         hypothyroidism    Cancer Paternal Grandmother         lung cancer    Hypertension Paternal Grandfather     Diabetes Paternal Grandfather        Social History     Socioeconomic History    Marital status: Single   Tobacco Use    Smoking status: Never    Smokeless tobacco: Never   Substance and Sexual Activity    Alcohol use: No    Drug use: No       Current Outpatient Medications   Medication Sig Dispense Refill    clotrimazole-betamethasone 1-0.05% (LOTRISONE) cream Apply topically 2 (two) times daily. 45 g 0    DULoxetine (CYMBALTA) 30 MG capsule Take 1 capsule (30 mg total) by mouth once daily. 30 capsule 11    varenicline (CHANTIX) 1 mg Tab Take ½ tablet once a day for 3 days, then increase to ½ tablet twice a day for 4 days. Beginning on Day 8, take 1 tablet twice daily until complete 52 tablet 0    nicotine (NICODERM CQ) 21 mg/24 hr Place 1 patch onto the skin once daily. 28 patch 0    pantoprazole (PROTONIX) 40 MG tablet Take 1 tablet (40 mg total) by mouth once daily. 30 tablet 11    ranitidine (ZANTAC) 150 MG tablet Take 1 tablet (150 mg total) by mouth 2 (two) times daily. 60 tablet 11     No current facility-administered medications for this visit.       Review of patient's allergies indicates:   Allergen Reactions    Azithromycin Hives    Amoxicillin     Wellbutrin [bupropion hcl] Other (See Comments)     Dizziness, near syncope       Physical examination  Vitals Reviewed\  Vitals:    12/05/22 1302   BP: 118/72   Pulse: 74   Temp: 98.6 °F (37 °C)     Body mass index is 35.53 kg/m².    Weight: 102.9 kg (226 lb 13.7 oz)    Gen. Well-dressed well-nourished   Skin warm dry and intact.  No rashes noted.  Neck is supple  without adenopathy  Chest.  Respirations are even unlabored.  Lungs are clear to auscultation.  Cardiac regular rate and rhythm.  No chest wall adenopathy noted.  Neuro. Awake alert oriented x4.  Normal judgment and cognition noted.  Extremities no clubbing cyanosis or edema noted.     Call or return to clinic prn if these symptoms worsen or fail to improve as anticipated.

## 2022-12-05 NOTE — PROGRESS NOTES
Tuberculin skin test applied to Left ventral forearm. Explained how to read the test, measuring induration not just erythema; she will come into office if test appears positive.

## 2022-12-06 LAB
BASOPHILS # BLD AUTO: 0.03 K/UL (ref 0–0.2)
BASOPHILS NFR BLD: 0.4 % (ref 0–1.9)
DIFFERENTIAL METHOD: NORMAL
EOSINOPHIL # BLD AUTO: 0.2 K/UL (ref 0–0.5)
EOSINOPHIL NFR BLD: 2.1 % (ref 0–8)
ERYTHROCYTE [DISTWIDTH] IN BLOOD BY AUTOMATED COUNT: 13.7 % (ref 11.5–14.5)
HCT VFR BLD AUTO: 43.6 % (ref 37–48.5)
HGB BLD-MCNC: 14 G/DL (ref 12–16)
IMM GRANULOCYTES # BLD AUTO: 0.02 K/UL (ref 0–0.04)
IMM GRANULOCYTES NFR BLD AUTO: 0.3 % (ref 0–0.5)
LYMPHOCYTES # BLD AUTO: 2.4 K/UL (ref 1–4.8)
LYMPHOCYTES NFR BLD: 30.5 % (ref 18–48)
MCH RBC QN AUTO: 29.2 PG (ref 27–31)
MCHC RBC AUTO-ENTMCNC: 32.1 G/DL (ref 32–36)
MCV RBC AUTO: 91 FL (ref 82–98)
MONOCYTES # BLD AUTO: 0.6 K/UL (ref 0.3–1)
MONOCYTES NFR BLD: 8 % (ref 4–15)
NEUTROPHILS # BLD AUTO: 4.6 K/UL (ref 1.8–7.7)
NEUTROPHILS NFR BLD: 58.7 % (ref 38–73)
NRBC BLD-RTO: 0 /100 WBC
PLATELET # BLD AUTO: 273 K/UL (ref 150–450)
PMV BLD AUTO: 11.3 FL (ref 9.2–12.9)
RBC # BLD AUTO: 4.8 M/UL (ref 4–5.4)
WBC # BLD AUTO: 7.75 K/UL (ref 3.9–12.7)

## 2022-12-07 ENCOUNTER — CLINICAL SUPPORT (OUTPATIENT)
Dept: FAMILY MEDICINE | Facility: CLINIC | Age: 22
End: 2022-12-07
Payer: OTHER GOVERNMENT

## 2022-12-07 LAB
TB INDURATION - 48 HR READ: 0 MM
TB INDURATION - 72 HR READ: NORMAL
TB SKIN TEST - 48 HR READ: NEGATIVE
TB SKIN TEST - 72 HR READ: NORMAL

## 2022-12-07 NOTE — PROGRESS NOTES
PPD Reading Note  PPD read and results entered in [facility].  Result: 0 mm induration.  Interpretation: negative  If test not read within 48-72 hours of initial placement, patient advised to repeat in other arm 1-3 weeks after this test.  Allergic reaction: no

## 2022-12-13 ENCOUNTER — CLINICAL SUPPORT (OUTPATIENT)
Dept: SMOKING CESSATION | Facility: CLINIC | Age: 22
End: 2022-12-13

## 2022-12-13 DIAGNOSIS — F17.200 NICOTINE DEPENDENCE: Primary | ICD-10-CM

## 2022-12-13 PROCEDURE — 99999 PR PBB SHADOW E&M-EST. PATIENT-LVL I: ICD-10-PCS | Mod: PBBFAC,,,

## 2022-12-13 PROCEDURE — 99407 PR TOBACCO USE CESSATION INTENSIVE >10 MINUTES: ICD-10-PCS | Mod: S$GLB,,,

## 2022-12-13 PROCEDURE — 99407 BEHAV CHNG SMOKING > 10 MIN: CPT | Mod: S$GLB,,,

## 2022-12-13 PROCEDURE — 99999 PR PBB SHADOW E&M-EST. PATIENT-LVL I: CPT | Mod: PBBFAC,,,

## 2022-12-13 NOTE — PROGRESS NOTES
Spoke with patient today in regard to smoking cessation progress/3 month telephone f/u, she states not tobacco free.  Informed patient of benefit period and contact information if any further help or support is needed. Made pt a follow up appointment 01/03 with Maria Elena. Will continue to follow up with pt on Quit attempt #1.

## 2022-12-29 ENCOUNTER — OFFICE VISIT (OUTPATIENT)
Dept: FAMILY MEDICINE | Facility: CLINIC | Age: 22
End: 2022-12-29
Payer: OTHER GOVERNMENT

## 2022-12-29 VITALS
OXYGEN SATURATION: 98 % | WEIGHT: 227.75 LBS | HEIGHT: 67 IN | DIASTOLIC BLOOD PRESSURE: 72 MMHG | TEMPERATURE: 99 F | BODY MASS INDEX: 35.75 KG/M2 | HEART RATE: 87 BPM | SYSTOLIC BLOOD PRESSURE: 120 MMHG

## 2022-12-29 DIAGNOSIS — R00.0 TACHYCARDIA: Primary | ICD-10-CM

## 2022-12-29 DIAGNOSIS — Z00.00 WELL WOMAN EXAM WITHOUT GYNECOLOGICAL EXAM: ICD-10-CM

## 2022-12-29 PROCEDURE — 99999 PR PBB SHADOW E&M-EST. PATIENT-LVL III: ICD-10-PCS | Mod: PBBFAC,,, | Performed by: NURSE PRACTITIONER

## 2022-12-29 PROCEDURE — 99395 PR PREVENTIVE VISIT,EST,18-39: ICD-10-PCS | Mod: S$GLB,,, | Performed by: NURSE PRACTITIONER

## 2022-12-29 PROCEDURE — 99395 PREV VISIT EST AGE 18-39: CPT | Mod: S$GLB,,, | Performed by: NURSE PRACTITIONER

## 2022-12-29 PROCEDURE — 99999 PR PBB SHADOW E&M-EST. PATIENT-LVL III: CPT | Mod: PBBFAC,,, | Performed by: NURSE PRACTITIONER

## 2022-12-29 NOTE — PROGRESS NOTES
This dictation has been generated using Modal Fluency Dictation some phonetic errors may occur. Please contact author for clarification if needed.     Problem List Items Addressed This Visit    None  Visit Diagnoses       Tachycardia    -  Primary    Relevant Orders    Exercise Stress - EKG    Well woman exam without gynecological exam                Orders Placed This Encounter    Exercise Stress - EKG       Fever night sweats nausea tachycardia reviewed labs acceptable.  Ruled out TB.  Check EKG I reviewed the images no AMI.  No atrial fibrillation.  Right bundle branch block proceed with stress echo and Holter.  Urinalysis and POCT urine pregnancy acceptable.   annual exam without well-woman gyn exam.  Follow-up with gynecologist.    No follow-ups on file.    ________________________________________________________________  ________________________________________________________________      Chief Complaint   Patient presents with    Follow-up     History of present illness  This 22 y.o. presents today for complaint of recently seen for annual exam and tachycardia.  Noted elevated heart rates with exercise up to 200.  I had referred her for stress testing for the 23rd of this month which she had to reschedule.  Patient denies any chest pain.  She does remain active.  She is sexually active.  Has no complaints.  Up-to-date on vaccinations per her report and she will provide us with the record of her HPV injection and recent tetanus injection.  Patient also up-to-date with gyn per her report.  Offered flu shot which she refused.  She Had recent COVID vaccination.  LOV 12/2/2022 fever for greater than a week.  Denies any cold symptoms.  Did have some night sweats.  Patient denies  symptoms.  No weight loss.  No cough or shortness of breath.  Nausea without vomiting or diarrhea.  No rash.    Past Medical History:   Diagnosis Date    Hypertension        Past Surgical History:   Procedure Laterality Date    FOOT  SURGERY Right     fracture repair       Family History   Problem Relation Age of Onset    Asthma Mother     Allergies Mother     Thyroid disease Father         hypothyroidism    Cancer Paternal Grandmother         lung cancer    Hypertension Paternal Grandfather     Diabetes Paternal Grandfather        Social History     Socioeconomic History    Marital status: Single   Tobacco Use    Smoking status: Never    Smokeless tobacco: Never   Substance and Sexual Activity    Alcohol use: No    Drug use: No       Current Outpatient Medications   Medication Sig Dispense Refill    clotrimazole-betamethasone 1-0.05% (LOTRISONE) cream Apply topically 2 (two) times daily. 45 g 0    DULoxetine (CYMBALTA) 30 MG capsule Take 1 capsule (30 mg total) by mouth once daily. 30 capsule 11    nicotine (NICODERM CQ) 21 mg/24 hr Place 1 patch onto the skin once daily. 28 patch 0    varenicline (CHANTIX) 1 mg Tab Take ½ tablet once a day for 3 days, then increase to ½ tablet twice a day for 4 days. Beginning on Day 8, take 1 tablet twice daily until complete 52 tablet 0    pantoprazole (PROTONIX) 40 MG tablet Take 1 tablet (40 mg total) by mouth once daily. 30 tablet 11    ranitidine (ZANTAC) 150 MG tablet Take 1 tablet (150 mg total) by mouth 2 (two) times daily. 60 tablet 11     No current facility-administered medications for this visit.       Review of patient's allergies indicates:   Allergen Reactions    Azithromycin Hives    Amoxicillin     Wellbutrin [bupropion hcl] Other (See Comments)     Dizziness, near syncope       Physical examination  Vitals Reviewed\  Vitals:    12/29/22 1313   BP: 120/72   Pulse: 87   Temp: 98.7 °F (37.1 °C)     Body mass index is 35.67 kg/m².    Weight: 103.3 kg (227 lb 11.8 oz)    Gen. Well-dressed well-nourished   Skin warm dry and intact.  No rashes noted.  Neck is supple without adenopathy  Chest.  Respirations are even unlabored.  Lungs are clear to auscultation.  Cardiac regular rate and rhythm.  No  chest wall adenopathy noted.  Neuro. Awake alert oriented x4.  Normal judgment and cognition noted.  Extremities no clubbing cyanosis or edema noted.     Call or return to clinic prn if these symptoms worsen or fail to improve as anticipated.

## 2022-12-30 ENCOUNTER — TELEPHONE (OUTPATIENT)
Dept: CARDIOLOGY | Facility: HOSPITAL | Age: 22
End: 2022-12-30

## 2023-01-03 ENCOUNTER — CLINICAL SUPPORT (OUTPATIENT)
Dept: SMOKING CESSATION | Facility: CLINIC | Age: 23
End: 2023-01-03

## 2023-01-03 ENCOUNTER — CLINICAL SUPPORT (OUTPATIENT)
Dept: CARDIOLOGY | Facility: HOSPITAL | Age: 23
End: 2023-01-03
Attending: NURSE PRACTITIONER
Payer: OTHER GOVERNMENT

## 2023-01-03 DIAGNOSIS — R00.0 TACHYCARDIA: ICD-10-CM

## 2023-01-03 DIAGNOSIS — F17.210 LIGHT SMOKER: Primary | ICD-10-CM

## 2023-01-03 DIAGNOSIS — F17.210 HEAVY CIGARETTE SMOKER (20-39 PER DAY): ICD-10-CM

## 2023-01-03 LAB
CV STRESS BASE HR: 88 BPM
DIASTOLIC BLOOD PRESSURE: 93 MMHG
EJECTION FRACTION: 65 %
OHS CV CPX 1 MINUTE RECOVERY HEART RATE: 155 BPM
OHS CV CPX 85 PERCENT MAX PREDICTED HEART RATE MALE: 158
OHS CV CPX ESTIMATED METS: 12
OHS CV CPX MAX PREDICTED HEART RATE: 186
OHS CV CPX PATIENT IS FEMALE: 1
OHS CV CPX PATIENT IS MALE: 0
OHS CV CPX PEAK DIASTOLIC BLOOD PRESSURE: 88 MMHG
OHS CV CPX PEAK HEAR RATE: 193 BPM
OHS CV CPX PEAK RATE PRESSURE PRODUCT: NORMAL
OHS CV CPX PEAK SYSTOLIC BLOOD PRESSURE: 156 MMHG
OHS CV CPX PERCENT MAX PREDICTED HEART RATE ACHIEVED: 104
OHS CV CPX RATE PRESSURE PRODUCT PRESENTING: NORMAL
STRESS ECHO POST EXERCISE DUR MIN: 10 MINUTES
STRESS ECHO POST EXERCISE DUR SEC: 16 SECONDS
SYSTOLIC BLOOD PRESSURE: 136 MMHG

## 2023-01-03 PROCEDURE — 99999 PR PBB SHADOW E&M-EST. PATIENT-LVL I: ICD-10-PCS | Mod: PBBFAC,,,

## 2023-01-03 PROCEDURE — 93351 STRESS TTE COMPLETE: CPT | Mod: 26,,, | Performed by: INTERNAL MEDICINE

## 2023-01-03 PROCEDURE — 93351 STRESS ECHO (CUPID ONLY): ICD-10-PCS | Mod: 26,,, | Performed by: INTERNAL MEDICINE

## 2023-01-03 PROCEDURE — 93351 STRESS TTE COMPLETE: CPT

## 2023-01-03 PROCEDURE — 99403 PREV MED CNSL INDIV APPRX 45: CPT | Mod: S$GLB,,,

## 2023-01-03 PROCEDURE — 99403 PR PREVENT COUNSEL,INDIV,45 MIN: ICD-10-PCS | Mod: S$GLB,,,

## 2023-01-03 PROCEDURE — 99999 PR PBB SHADOW E&M-EST. PATIENT-LVL I: CPT | Mod: PBBFAC,,,

## 2023-01-05 LAB — CHLAMYDIA, NUC. ACID AMP: NEGATIVE

## 2023-01-11 NOTE — PROGRESS NOTES
Individual Follow-Up Form    1/3/23  Quit Date:     Clinical Status of Patient: Outpatient    Continuing Medication: yes  Chantix    Other Medications: Nicotine patches     Target Symptoms: Withdrawal and medication side effects. The following were  rated moderate (3) to severe (4) on TCRS:  Moderate (3): none  Severe (4): none    Comments: Patient reports a lapse smoking a vape borrowed from friends.  Patient reports breaking up with her boy friend which led to use.  We reviewed managing stress, changing habitual behavior, and high risk situations.  We reviewed avoid leave strategies and setting a new quit date.  Patient continue to use 1 mg Chantix QD and 21 mg nicotine patch without any negative side effects at this time.  The patient denies any abnormal behavioral or mental changes at this time. The patient will continue with  therapy sessions and medication monitoring by CTTS. Prescribed medication management will be by physician.      Diagnosis: F17.210    Next Visit: 2 weeks

## 2023-01-18 RX ORDER — VARENICLINE TARTRATE 1 MG/1
TABLET, FILM COATED ORAL
Qty: 52 TABLET | Refills: 0 | OUTPATIENT
Start: 2023-01-18 | End: 2023-08-15 | Stop reason: SDUPTHER

## 2023-01-18 RX ORDER — IBUPROFEN 200 MG
1 TABLET ORAL DAILY
Qty: 28 PATCH | Refills: 0 | Status: SHIPPED | OUTPATIENT
Start: 2023-01-18 | End: 2023-08-15 | Stop reason: SDUPTHER

## 2023-01-20 DIAGNOSIS — S39.011A STRAIN OF MUSCLE, FASCIA AND TENDON OF ABDOMEN, INITIAL ENCOUNTER: ICD-10-CM

## 2023-01-20 DIAGNOSIS — R10.32 ABDOMINAL PAIN, LEFT LOWER QUADRANT: Primary | ICD-10-CM

## 2023-01-20 DIAGNOSIS — R10.2 PELVIC PAIN: ICD-10-CM

## 2023-01-21 ENCOUNTER — HOSPITAL ENCOUNTER (OUTPATIENT)
Dept: RADIOLOGY | Facility: HOSPITAL | Age: 23
Discharge: HOME | End: 2023-01-21
Attending: NURSE PRACTITIONER
Payer: OTHER GOVERNMENT

## 2023-01-21 DIAGNOSIS — S39.011A STRAIN OF MUSCLE, FASCIA AND TENDON OF ABDOMEN, INITIAL ENCOUNTER: ICD-10-CM

## 2023-01-21 DIAGNOSIS — R10.2 PELVIC PAIN: ICD-10-CM

## 2023-01-21 DIAGNOSIS — R10.32 ABDOMINAL PAIN, LEFT LOWER QUADRANT: ICD-10-CM

## 2023-01-21 PROCEDURE — 76700 US EXAM ABDOM COMPLETE: CPT | Mod: TC

## 2023-01-21 PROCEDURE — 76856 US EXAM PELVIC COMPLETE: CPT | Mod: TC

## 2023-02-09 ENCOUNTER — PATIENT MESSAGE (OUTPATIENT)
Dept: FAMILY MEDICINE | Facility: CLINIC | Age: 23
End: 2023-02-09
Payer: OTHER GOVERNMENT

## 2023-02-14 ENCOUNTER — TELEPHONE (OUTPATIENT)
Dept: FAMILY MEDICINE | Facility: CLINIC | Age: 23
End: 2023-02-14
Payer: OTHER GOVERNMENT

## 2023-02-27 ENCOUNTER — TELEPHONE (OUTPATIENT)
Dept: SMOKING CESSATION | Facility: CLINIC | Age: 23
End: 2023-02-27
Payer: OTHER GOVERNMENT

## 2023-03-27 ENCOUNTER — OCCUPATIONAL HEALTH (OUTPATIENT)
Dept: URGENT CARE | Facility: CLINIC | Age: 23
End: 2023-03-27
Payer: OTHER GOVERNMENT

## 2023-03-27 DIAGNOSIS — Z13.9 ENCOUNTER FOR SCREENING: Primary | ICD-10-CM

## 2023-03-27 LAB — COLLECTION ONLY: NORMAL

## 2023-03-27 PROCEDURE — 80305 OOH NON-DOT DRUG SCREEN: ICD-10-PCS | Mod: S$GLB,,, | Performed by: EMERGENCY MEDICINE

## 2023-03-27 PROCEDURE — 80305 DRUG TEST PRSMV DIR OPT OBS: CPT | Mod: S$GLB,,, | Performed by: EMERGENCY MEDICINE

## 2023-04-17 ENCOUNTER — TELEPHONE (OUTPATIENT)
Dept: FAMILY MEDICINE | Facility: CLINIC | Age: 23
End: 2023-04-17
Payer: OTHER GOVERNMENT

## 2023-06-01 ENCOUNTER — TELEPHONE (OUTPATIENT)
Dept: SMOKING CESSATION | Facility: CLINIC | Age: 23
End: 2023-06-01
Payer: OTHER GOVERNMENT

## 2023-06-01 NOTE — TELEPHONE ENCOUNTER
1st attempt left message regarding smoking cessation quit 1 episode.  Voice message includes name and contact information.

## 2023-08-15 DIAGNOSIS — F17.210 HEAVY CIGARETTE SMOKER (20-39 PER DAY): ICD-10-CM

## 2023-08-15 RX ORDER — VARENICLINE TARTRATE 1 MG/1
TABLET, FILM COATED ORAL
Qty: 52 TABLET | Refills: 0 | Status: SHIPPED | OUTPATIENT
Start: 2023-08-15 | End: 2023-08-30 | Stop reason: SDUPTHER

## 2023-08-15 RX ORDER — IBUPROFEN 200 MG
1 TABLET ORAL DAILY
Qty: 28 PATCH | Refills: 0 | Status: SHIPPED | OUTPATIENT
Start: 2023-08-15 | End: 2023-08-30 | Stop reason: SDUPTHER

## 2023-08-15 NOTE — TELEPHONE ENCOUNTER
Refill Routing Note     Refill Routing Note   Medication(s) are not appropriate for processing by [facility] for the following reason(s):      Medication outside of protocol  Responsible provider unclear    [facility] action(s):  Route Care Due:  None identified     Medication Therapy Plan: no pcp listed on profile      Appointments  past 12m or future 3m with PCP    Date Provider   Last Visit   Visit date not found Jared Goel MD   Next Visit   Visit date not found Jared Goel MD   ED visits in past 90 days: 0        Note composed:1:43 PM 08/15/2023

## 2023-08-18 DIAGNOSIS — F17.210 HEAVY CIGARETTE SMOKER (20-39 PER DAY): ICD-10-CM

## 2023-08-18 RX ORDER — VARENICLINE TARTRATE 1 MG/1
TABLET, FILM COATED ORAL
Qty: 52 TABLET | Refills: 0 | Status: CANCELLED | OUTPATIENT
Start: 2023-08-18

## 2023-08-23 ENCOUNTER — CLINICAL SUPPORT (OUTPATIENT)
Dept: SMOKING CESSATION | Facility: CLINIC | Age: 23
End: 2023-08-23

## 2023-08-23 DIAGNOSIS — F17.200 NICOTINE DEPENDENCE: Primary | ICD-10-CM

## 2023-08-23 PROCEDURE — 99999 PR PBB SHADOW E&M-EST. PATIENT-LVL I: CPT | Mod: PBBFAC,,,

## 2023-08-23 PROCEDURE — 99999 PR PBB SHADOW E&M-EST. PATIENT-LVL I: ICD-10-PCS | Mod: PBBFAC,,,

## 2023-08-23 PROCEDURE — 99407 PR TOBACCO USE CESSATION INTENSIVE >10 MINUTES: ICD-10-PCS | Mod: S$GLB,,, | Performed by: GENERAL PRACTICE

## 2023-08-23 PROCEDURE — 99407 BEHAV CHNG SMOKING > 10 MIN: CPT | Mod: S$GLB,,, | Performed by: GENERAL PRACTICE

## 2023-08-23 NOTE — PROGRESS NOTES
Spoke with patient today in regard to smoking cessation progress 3/6/12 month telephone follow up, she states that she is not tobacco free.  Commended patient on the accomplishments thus far.  Informed patient of benefit period, future follow up, and contact information if any further help or support is needed.  Patient is going to contact her preferred pharmacy to attempt to  her prescribed smoking cessation medication.  Patient will also contact smoking cessation department when she ready to rejoin smoking cessation program.  Will complete smart form for 3/6/12 Quit Attempt #1 and resolve episode and 3/6/12 month follow up on Quit attempt #2 and resolve episode.

## 2023-08-30 ENCOUNTER — OFFICE VISIT (OUTPATIENT)
Dept: FAMILY MEDICINE | Facility: CLINIC | Age: 23
End: 2023-08-30
Payer: OTHER GOVERNMENT

## 2023-08-30 VITALS
HEART RATE: 75 BPM | SYSTOLIC BLOOD PRESSURE: 118 MMHG | TEMPERATURE: 98 F | HEIGHT: 67 IN | OXYGEN SATURATION: 99 % | DIASTOLIC BLOOD PRESSURE: 72 MMHG | WEIGHT: 214.5 LBS | BODY MASS INDEX: 33.67 KG/M2

## 2023-08-30 DIAGNOSIS — F17.210 HEAVY CIGARETTE SMOKER (20-39 PER DAY): ICD-10-CM

## 2023-08-30 DIAGNOSIS — G44.89 CHRONIC MIXED HEADACHE SYNDROME: Primary | ICD-10-CM

## 2023-08-30 DIAGNOSIS — R42 DIZZINESS: ICD-10-CM

## 2023-08-30 PROCEDURE — 99999 PR PBB SHADOW E&M-EST. PATIENT-LVL IV: ICD-10-PCS | Mod: PBBFAC,,, | Performed by: NURSE PRACTITIONER

## 2023-08-30 PROCEDURE — 99213 OFFICE O/P EST LOW 20 MIN: CPT | Mod: S$GLB,,, | Performed by: NURSE PRACTITIONER

## 2023-08-30 PROCEDURE — 99213 PR OFFICE/OUTPT VISIT, EST, LEVL III, 20-29 MIN: ICD-10-PCS | Mod: S$GLB,,, | Performed by: NURSE PRACTITIONER

## 2023-08-30 PROCEDURE — 99999 PR PBB SHADOW E&M-EST. PATIENT-LVL IV: CPT | Mod: PBBFAC,,, | Performed by: NURSE PRACTITIONER

## 2023-08-30 RX ORDER — VARENICLINE TARTRATE 1 MG/1
TABLET, FILM COATED ORAL
Qty: 52 TABLET | Refills: 0 | Status: SHIPPED | OUTPATIENT
Start: 2023-08-30 | End: 2023-08-30 | Stop reason: SDUPTHER

## 2023-08-30 RX ORDER — IBUPROFEN 200 MG
1 TABLET ORAL DAILY
Qty: 28 PATCH | Refills: 0 | Status: SHIPPED | OUTPATIENT
Start: 2023-08-30 | End: 2024-03-08 | Stop reason: ALTCHOICE

## 2023-08-30 RX ORDER — VARENICLINE TARTRATE 1 MG/1
TABLET, FILM COATED ORAL
Qty: 52 TABLET | Refills: 0 | Status: SHIPPED | OUTPATIENT
Start: 2023-08-30 | End: 2024-03-08 | Stop reason: ALTCHOICE

## 2023-08-30 NOTE — PROGRESS NOTES
This dictation has been generated using Modal Fluency Dictation some phonetic errors may occur. Please contact author for clarification if needed.     Problem List Items Addressed This Visit       Chronic mixed headache syndrome - Primary     Other Visit Diagnoses       Heavy cigarette smoker (20-39 per day)        Relevant Medications    nicotine (NICODERM CQ) 21 mg/24 hr    varenicline (CHANTIX) 1 mg Tab    Dizziness                Orders Placed This Encounter    nicotine (NICODERM CQ) 21 mg/24 hr    varenicline (CHANTIX) 1 mg Tab       Smoking cessation. She has not been able to get her refill. Try refill but if required Dr. Leggett can resend.   Headache. Chronic. No new s/s continue same.  Consider neuro referral in the future.   Dizzy monitor. Consider ent in the future.     No follow-ups on file.    ________________________________________________________________  ________________________________________________________________      Chief Complaint   Patient presents with    Headache     History of present illness  This 22 y.o. presents today for complaint of trouble getting chantix and nicoderm. Needs refill.   She has chronic headaches. She indicates the temple region. She notes nicotine use and stress as contributing factors.   Dizziness. No spinning. She feels off and symptoms when she stands. BP normal. No ear complaints.     Past Medical History:   Diagnosis Date    Hypertension        Past Surgical History:   Procedure Laterality Date    FOOT SURGERY Right     fracture repair       Family History   Problem Relation Age of Onset    Asthma Mother     Allergies Mother     Thyroid disease Father         hypothyroidism    Cancer Paternal Grandmother         lung cancer    Hypertension Paternal Grandfather     Diabetes Paternal Grandfather        Social History     Socioeconomic History    Marital status: Single   Tobacco Use    Smoking status: Never    Smokeless tobacco: Never   Substance and Sexual Activity     Alcohol use: No    Drug use: No       Current Outpatient Medications   Medication Sig Dispense Refill    clotrimazole-betamethasone 1-0.05% (LOTRISONE) cream Apply topically 2 (two) times daily. 45 g 0    DULoxetine (CYMBALTA) 30 MG capsule TAKE 1 CAPSULE(30 MG) BY MOUTH EVERY DAY 30 capsule 11    nicotine (NICODERM CQ) 21 mg/24 hr Place 1 patch onto the skin once daily. 28 patch 0    pantoprazole (PROTONIX) 40 MG tablet Take 1 tablet (40 mg total) by mouth once daily. 30 tablet 11    ranitidine (ZANTAC) 150 MG tablet Take 1 tablet (150 mg total) by mouth 2 (two) times daily. 60 tablet 11    varenicline (CHANTIX) 1 mg Tab Take 0.5 tablet once a day for 3 days, then increase to 0.5 tablet twice a day for 4 days. Beginning on Day 8, take 1 tablet twice daily until complete 52 tablet 0     No current facility-administered medications for this visit.       Review of patient's allergies indicates:   Allergen Reactions    Azithromycin Hives    Amoxicillin     Wellbutrin [bupropion hcl] Other (See Comments)     Dizziness, near syncope       Physical examination  Vitals Reviewed\  Vitals:    08/30/23 0716   BP: 118/72   Pulse: 75   Temp: 98 °F (36.7 °C)     Body mass index is 33.6 kg/m².  Gen. Well-dressed well-nourished   Skin warm dry and intact.  No rashes noted.  HEENT.  TM intact bilateral with normal light reflex.  No mastoid tenderness during percussion.  Nares patent bilateral.  Pharynx is unremarkable.   Neck is supple without adenopathy  Chest.  Respirations are even unlabored.    Neuro. Awake alert oriented x4.  Normal judgment and cognition noted.  Extremities no clubbing cyanosis or edema noted.     Call or return to clinic prn if these symptoms worsen or fail to improve as anticipated.

## 2023-10-03 ENCOUNTER — ON-DEMAND VIRTUAL (OUTPATIENT)
Dept: URGENT CARE | Facility: CLINIC | Age: 23
End: 2023-10-03
Payer: OTHER GOVERNMENT

## 2023-10-03 ENCOUNTER — HOSPITAL ENCOUNTER (EMERGENCY)
Facility: HOSPITAL | Age: 23
Discharge: LEFT BEFORE DISCHARGE | End: 2023-10-03
Payer: OTHER GOVERNMENT

## 2023-10-03 VITALS
WEIGHT: 200 LBS | TEMPERATURE: 98 F | HEART RATE: 76 BPM | BODY MASS INDEX: 30.31 KG/M2 | HEIGHT: 68 IN | OXYGEN SATURATION: 99 % | DIASTOLIC BLOOD PRESSURE: 95 MMHG | SYSTOLIC BLOOD PRESSURE: 134 MMHG | RESPIRATION RATE: 20 BRPM

## 2023-10-03 DIAGNOSIS — R55 SYNCOPE, UNSPECIFIED SYNCOPE TYPE: ICD-10-CM

## 2023-10-03 DIAGNOSIS — R11.10 VOMITING, UNSPECIFIED VOMITING TYPE, UNSPECIFIED WHETHER NAUSEA PRESENT: Primary | ICD-10-CM

## 2023-10-03 DIAGNOSIS — R42 DIZZINESS: ICD-10-CM

## 2023-10-03 LAB
ALBUMIN SERPL BCP-MCNC: 4.2 G/DL (ref 3.5–5.2)
ALP SERPL-CCNC: 72 U/L (ref 55–135)
ALT SERPL W/O P-5'-P-CCNC: 25 U/L (ref 10–44)
ANION GAP SERPL CALC-SCNC: 13 MMOL/L (ref 8–16)
AST SERPL-CCNC: 28 U/L (ref 10–40)
BASOPHILS # BLD AUTO: 0.02 K/UL (ref 0–0.2)
BASOPHILS NFR BLD: 0.3 % (ref 0–1.9)
BILIRUB SERPL-MCNC: 1 MG/DL (ref 0.1–1)
BUN SERPL-MCNC: 8 MG/DL (ref 6–20)
CALCIUM SERPL-MCNC: 9.2 MG/DL (ref 8.7–10.5)
CHLORIDE SERPL-SCNC: 103 MMOL/L (ref 95–110)
CO2 SERPL-SCNC: 21 MMOL/L (ref 23–29)
CREAT SERPL-MCNC: 0.8 MG/DL (ref 0.5–1.4)
DIFFERENTIAL METHOD: NORMAL
EOSINOPHIL # BLD AUTO: 0.1 K/UL (ref 0–0.5)
EOSINOPHIL NFR BLD: 1.7 % (ref 0–8)
ERYTHROCYTE [DISTWIDTH] IN BLOOD BY AUTOMATED COUNT: 12.8 % (ref 11.5–14.5)
EST. GFR  (NO RACE VARIABLE): >60 ML/MIN/1.73 M^2
GLUCOSE SERPL-MCNC: 98 MG/DL (ref 70–110)
HCT VFR BLD AUTO: 44.3 % (ref 37–48.5)
HGB BLD-MCNC: 14.5 G/DL (ref 12–16)
IMM GRANULOCYTES # BLD AUTO: 0.02 K/UL (ref 0–0.04)
IMM GRANULOCYTES NFR BLD AUTO: 0.3 % (ref 0–0.5)
LYMPHOCYTES # BLD AUTO: 1.4 K/UL (ref 1–4.8)
LYMPHOCYTES NFR BLD: 24 % (ref 18–48)
MCH RBC QN AUTO: 29.4 PG (ref 27–31)
MCHC RBC AUTO-ENTMCNC: 32.7 G/DL (ref 32–36)
MCV RBC AUTO: 90 FL (ref 82–98)
MONOCYTES # BLD AUTO: 0.5 K/UL (ref 0.3–1)
MONOCYTES NFR BLD: 9.3 % (ref 4–15)
NEUTROPHILS # BLD AUTO: 3.7 K/UL (ref 1.8–7.7)
NEUTROPHILS NFR BLD: 64.4 % (ref 38–73)
NRBC BLD-RTO: 0 /100 WBC
PLATELET # BLD AUTO: 262 K/UL (ref 150–450)
PMV BLD AUTO: 10.4 FL (ref 9.2–12.9)
POTASSIUM SERPL-SCNC: 3.8 MMOL/L (ref 3.5–5.1)
PROT SERPL-MCNC: 7.7 G/DL (ref 6–8.4)
RBC # BLD AUTO: 4.93 M/UL (ref 4–5.4)
SODIUM SERPL-SCNC: 137 MMOL/L (ref 136–145)
WBC # BLD AUTO: 5.8 K/UL (ref 3.9–12.7)

## 2023-10-03 PROCEDURE — 80053 COMPREHEN METABOLIC PANEL: CPT | Performed by: NURSE PRACTITIONER

## 2023-10-03 PROCEDURE — 99281 EMR DPT VST MAYX REQ PHY/QHP: CPT

## 2023-10-03 PROCEDURE — 99212 OFFICE O/P EST SF 10 MIN: CPT | Mod: 95,,, | Performed by: INTERNAL MEDICINE

## 2023-10-03 PROCEDURE — 36415 COLL VENOUS BLD VENIPUNCTURE: CPT | Performed by: NURSE PRACTITIONER

## 2023-10-03 PROCEDURE — 85025 COMPLETE CBC W/AUTO DIFF WBC: CPT | Performed by: NURSE PRACTITIONER

## 2023-10-03 PROCEDURE — 99212 PR OFFICE/OUTPT VISIT, EST, LEVL II, 10-19 MIN: ICD-10-PCS | Mod: 95,,, | Performed by: INTERNAL MEDICINE

## 2023-10-03 NOTE — FIRST PROVIDER EVALUATION
" Emergency Department TeleTriage Encounter Note      CHIEF COMPLAINT    Chief Complaint   Patient presents with    Headache     Started yesterday        VITAL SIGNS   Initial Vitals [10/03/23 1735]   BP Pulse Resp Temp SpO2   (!) 134/95 76 20 98.3 °F (36.8 °C) 99 %      MAP       --            ALLERGIES    Review of patient's allergies indicates:   Allergen Reactions    Azithromycin Hives    Amoxicillin     Wellbutrin [bupropion hcl] Other (See Comments)     Dizziness, near syncope       PROVIDER TRIAGE NOTE  Verbal consent for the teletriage evaluation was given by the patient at the start of the evaluation.  All efforts will be made to maintain patient's privacy during the evaluation.      This is a teletriage evaluation of a 23 y.o. female presenting to the ED with c/o migraine HA that started yesterday and "passed out" with dizziness. Has not had headaches in years.  Limited physical exam via telehealth: The patient is awake, alert, answering questions appropriately and is not in respiratory distress.  As the Teletriage provider, I performed an initial assessment and ordered appropriate labs and imaging studies, if any, to facilitate the patient's care once placed in the ED. Once a room is available, care and a full evaluation will be completed by an alternate ED provider.  Any additional orders and the final disposition will be determined by that provider.  All imaging and labs will not be followed-up by the Teletriage Team, including myself.          ORDERS  Labs Reviewed   CBC W/ AUTO DIFFERENTIAL   POCT GLUCOSE MONITORING CONTINUOUS       ED Orders (720h ago, onward)      Start Ordered     Status Ordering Provider    10/03/23 1742 10/03/23 1741  CBC auto differential  STAT         Ordered LOGAN VILLALPANDO    10/03/23 1742 10/03/23 1741  Comprehensive metabolic panel  STAT         Ordered LOGAN VILLALPANDO    10/03/23 1742 10/03/23 1741  Urinalysis, Reflex to Urine Culture Urine, Clean Catch  STAT         Ordered " IRASEMALOGAN MUÑOZ    10/03/23 1742 10/03/23 1741  Pregnancy, urine rapid  STAT         Ordered LOGAN VILLALPANDO    10/03/23 1741 10/03/23 1741  Saline lock IV  Once         Ordered LOGAN VILLALPANDO    10/03/23 1741 10/03/23 1741  Orthostatic blood pressure  Once         Ordered LOGAN VILLALPANDO    10/03/23 1741 10/03/23 1741  POCT glucose  Once         Ordered LOGAN VILLALPANDO    10/03/23 1741 10/03/23 1741  EKG 12-lead  Once         Ordered LOGAN VILLALPANDO              Virtual Visit Note: The provider triage portion of this emergency department evaluation and documentation was performed via [employer], a HIPAA-compliant telemedicine application, in concert with a tele-presenter in the room. A face to face patient evaluation with one of my colleagues will occur once the patient is placed in an emergency department room.      DISCLAIMER: This note was prepared with WowOw[employer] voice recognition transcription software. Garbled syntax, mangled pronouns, and other bizarre constructions may be attributed to that software system.

## 2023-10-03 NOTE — PROGRESS NOTES
Subjective:      Patient ID: Candelario Nolan is a 23 y.o. female.    Vitals:  vitals were not taken for this visit.     Chief Complaint: Passed out      Visit Type: TELE AUDIOVISUAL    Present with the patient at the time of consultation: TELEMED PRESENT WITH PATIENT: None    Past Medical History:   Diagnosis Date    Hypertension      Past Surgical History:   Procedure Laterality Date    FOOT SURGERY Right     fracture repair     Review of patient's allergies indicates:   Allergen Reactions    Azithromycin Hives    Amoxicillin     Wellbutrin [bupropion hcl] Other (See Comments)     Dizziness, near syncope     Current Outpatient Medications on File Prior to Visit   Medication Sig Dispense Refill    clotrimazole-betamethasone 1-0.05% (LOTRISONE) cream Apply topically 2 (two) times daily. 45 g 0    DULoxetine (CYMBALTA) 30 MG capsule TAKE 1 CAPSULE(30 MG) BY MOUTH EVERY DAY 30 capsule 11    nicotine (NICODERM CQ) 21 mg/24 hr Place 1 patch onto the skin once daily. 28 patch 0    pantoprazole (PROTONIX) 40 MG tablet Take 1 tablet (40 mg total) by mouth once daily. 30 tablet 11    ranitidine (ZANTAC) 150 MG tablet Take 1 tablet (150 mg total) by mouth 2 (two) times daily. 60 tablet 11    varenicline (CHANTIX) 1 mg Tab Take 0.5 tablet once a day for 3 days, then increase to 0.5 tablet twice a day for 4 days. Beginning on Day 8, take 1 tablet twice daily until complete 52 tablet 0     No current facility-administered medications on file prior to visit.     Family History   Problem Relation Age of Onset    Asthma Mother     Allergies Mother     Thyroid disease Father         hypothyroidism    Cancer Paternal Grandmother         lung cancer    Hypertension Paternal Grandfather     Diabetes Paternal Grandfather            Ohs Peq Odvv Intake    10/3/2023  1:35 PM CDT - Filed by Patient   Describe your reason for todays visit Migraine, passing out, throwing up   What is your current physical address in the event of a medical  "emergency? [address]   Are you able to take your vital signs? No   Please attach any relevant images or files          In [place] via video conference.     22 y/o woman with a complicated 2 day history. Yesterday she vomited multiple times and said she "passed out". She had restless sleep and then developed a migraine. Now she has bad pack pain in addition to her migraine and she continues to have vomiting. She is wanting to know if she should go in and be seen.         Constitution: Positive for activity change, fatigue and generalized weakness. Negative for fever.   Respiratory:  Negative for cough and shortness of breath.    Gastrointestinal:  Positive for nausea and vomiting.   Musculoskeletal:  Positive for back pain.   Neurological:  Negative for disorientation.   Psychiatric/Behavioral:  Negative for disorientation and confusion.         Objective:   The physical exam was conducted virtually.  Physical Exam   Constitutional: She does not appear ill. No distress.   HENT:   Nose: Nose normal.   Neck: No neck rigidity present.   Neurological: She is alert and at baseline.   Psychiatric: Mood and thought content normal.   No other pertinent findings on examination.     Assessment:     1. Vomiting, unspecified vomiting type, unspecified whether nausea present    2. Syncope, unspecified syncope type        Plan:       Vomiting, unspecified vomiting type, unspecified whether nausea present    Syncope, unspecified syncope type      I recommend you go in for an in person evaluation. Your symptoms of vomiting, back pain, and passing out need further evaluation in person.                "

## 2023-10-04 ENCOUNTER — OFFICE VISIT (OUTPATIENT)
Dept: FAMILY MEDICINE | Facility: CLINIC | Age: 23
End: 2023-10-04
Payer: OTHER GOVERNMENT

## 2023-10-04 ENCOUNTER — PATIENT MESSAGE (OUTPATIENT)
Dept: FAMILY MEDICINE | Facility: CLINIC | Age: 23
End: 2023-10-04

## 2023-10-04 VITALS — HEART RATE: 70 BPM | OXYGEN SATURATION: 100 % | WEIGHT: 213.19 LBS | HEIGHT: 68 IN | BODY MASS INDEX: 32.31 KG/M2

## 2023-10-04 DIAGNOSIS — G43.809 OTHER MIGRAINE WITHOUT STATUS MIGRAINOSUS, NOT INTRACTABLE: Primary | ICD-10-CM

## 2023-10-04 PROCEDURE — 99999 PR PBB SHADOW E&M-EST. PATIENT-LVL III: CPT | Mod: PBBFAC,,, | Performed by: NURSE PRACTITIONER

## 2023-10-04 PROCEDURE — 99999 PR PBB SHADOW E&M-EST. PATIENT-LVL III: ICD-10-PCS | Mod: PBBFAC,,, | Performed by: NURSE PRACTITIONER

## 2023-10-04 PROCEDURE — 99214 OFFICE O/P EST MOD 30 MIN: CPT | Mod: S$GLB,,, | Performed by: NURSE PRACTITIONER

## 2023-10-04 PROCEDURE — 99214 PR OFFICE/OUTPT VISIT, EST, LEVL IV, 30-39 MIN: ICD-10-PCS | Mod: S$GLB,,, | Performed by: NURSE PRACTITIONER

## 2023-10-04 RX ORDER — SUMATRIPTAN 50 MG/1
50 TABLET, FILM COATED ORAL
Qty: 10 TABLET | Refills: 0 | Status: SHIPPED | OUTPATIENT
Start: 2023-10-04 | End: 2024-03-08 | Stop reason: ALTCHOICE

## 2023-10-04 RX ORDER — MECLIZINE HYDROCHLORIDE 25 MG/1
25 TABLET ORAL 3 TIMES DAILY PRN
Qty: 15 TABLET | Refills: 0 | Status: SHIPPED | OUTPATIENT
Start: 2023-10-04 | End: 2024-03-08 | Stop reason: ALTCHOICE
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
## 2023-10-04 NOTE — PATIENT INSTRUCTIONS
Risk of somnolence discussed with patient. Do not drive or operate machinery while taking medication meclizine. Do not engage in task that require mental alertness.  Do not take if you are working within 10 hours.

## 2023-10-05 ENCOUNTER — HOSPITAL ENCOUNTER (EMERGENCY)
Facility: HOSPITAL | Age: 23
Discharge: HOME | End: 2023-10-05
Attending: EMERGENCY MEDICINE
Payer: OTHER GOVERNMENT

## 2023-10-05 VITALS
HEIGHT: 68 IN | SYSTOLIC BLOOD PRESSURE: 121 MMHG | HEART RATE: 54 BPM | RESPIRATION RATE: 20 BRPM | TEMPERATURE: 98 F | WEIGHT: 215 LBS | OXYGEN SATURATION: 98 % | BODY MASS INDEX: 32.58 KG/M2 | DIASTOLIC BLOOD PRESSURE: 75 MMHG

## 2023-10-05 DIAGNOSIS — G43.809 OTHER MIGRAINE WITHOUT STATUS MIGRAINOSUS, NOT INTRACTABLE: Primary | ICD-10-CM

## 2023-10-05 DIAGNOSIS — R53.83 FATIGUE: ICD-10-CM

## 2023-10-05 LAB
ALBUMIN SERPL BCP-MCNC: 4.6 G/DL (ref 3.5–5.2)
ALP SERPL-CCNC: 60 U/L (ref 55–135)
ALT SERPL W/O P-5'-P-CCNC: 22 U/L (ref 10–44)
ANION GAP SERPL CALC-SCNC: 8 MMOL/L (ref 8–16)
AST SERPL-CCNC: 20 U/L (ref 10–40)
B-HCG UR QL: NEGATIVE
BACTERIA #/AREA URNS HPF: ABNORMAL /HPF
BASOPHILS # BLD AUTO: 0.02 K/UL (ref 0–0.2)
BASOPHILS NFR BLD: 0.3 % (ref 0–1.9)
BILIRUB SERPL-MCNC: 0.6 MG/DL (ref 0.1–1)
BILIRUB UR QL STRIP: NEGATIVE
BUN SERPL-MCNC: 8 MG/DL (ref 6–20)
CALCIUM SERPL-MCNC: 9.3 MG/DL (ref 8.7–10.5)
CHLORIDE SERPL-SCNC: 103 MMOL/L (ref 95–110)
CLARITY UR: ABNORMAL
CO2 SERPL-SCNC: 26 MMOL/L (ref 23–29)
COLOR UR: YELLOW
CREAT SERPL-MCNC: 0.7 MG/DL (ref 0.5–1.4)
CTP QC/QA: YES
DIFFERENTIAL METHOD: NORMAL
EOSINOPHIL # BLD AUTO: 0.1 K/UL (ref 0–0.5)
EOSINOPHIL NFR BLD: 1.9 % (ref 0–8)
ERYTHROCYTE [DISTWIDTH] IN BLOOD BY AUTOMATED COUNT: 12.8 % (ref 11.5–14.5)
EST. GFR  (NO RACE VARIABLE): >60 ML/MIN/1.73 M^2
GLUCOSE SERPL-MCNC: 89 MG/DL (ref 70–110)
GLUCOSE UR QL STRIP: NEGATIVE
HCT VFR BLD AUTO: 45.4 % (ref 37–48.5)
HGB BLD-MCNC: 15.1 G/DL (ref 12–16)
HGB UR QL STRIP: NEGATIVE
HYALINE CASTS #/AREA URNS LPF: 14 /LPF
IMM GRANULOCYTES # BLD AUTO: 0.02 K/UL (ref 0–0.04)
IMM GRANULOCYTES NFR BLD AUTO: 0.3 % (ref 0–0.5)
KETONES UR QL STRIP: ABNORMAL
LEUKOCYTE ESTERASE UR QL STRIP: ABNORMAL
LYMPHOCYTES # BLD AUTO: 1.6 K/UL (ref 1–4.8)
LYMPHOCYTES NFR BLD: 27.2 % (ref 18–48)
MCH RBC QN AUTO: 29.5 PG (ref 27–31)
MCHC RBC AUTO-ENTMCNC: 33.3 G/DL (ref 32–36)
MCV RBC AUTO: 89 FL (ref 82–98)
MICROSCOPIC COMMENT: ABNORMAL
MONOCYTES # BLD AUTO: 0.5 K/UL (ref 0.3–1)
MONOCYTES NFR BLD: 8.7 % (ref 4–15)
NEUTROPHILS # BLD AUTO: 3.6 K/UL (ref 1.8–7.7)
NEUTROPHILS NFR BLD: 61.6 % (ref 38–73)
NITRITE UR QL STRIP: NEGATIVE
NRBC BLD-RTO: 0 /100 WBC
PH UR STRIP: 7 [PH] (ref 5–8)
PLATELET # BLD AUTO: 268 K/UL (ref 150–450)
PMV BLD AUTO: 10.6 FL (ref 9.2–12.9)
POTASSIUM SERPL-SCNC: 3.8 MMOL/L (ref 3.5–5.1)
PROT SERPL-MCNC: 7.6 G/DL (ref 6–8.4)
PROT UR QL STRIP: ABNORMAL
RBC # BLD AUTO: 5.12 M/UL (ref 4–5.4)
RBC #/AREA URNS HPF: 1 /HPF (ref 0–4)
SARS-COV-2 RDRP RESP QL NAA+PROBE: NEGATIVE
SODIUM SERPL-SCNC: 137 MMOL/L (ref 136–145)
SP GR UR STRIP: 1.02 (ref 1–1.03)
SQUAMOUS #/AREA URNS HPF: 13 /HPF
URN SPEC COLLECT METH UR: ABNORMAL
UROBILINOGEN UR STRIP-ACNC: >=8 EU/DL
WBC # BLD AUTO: 5.77 K/UL (ref 3.9–12.7)
WBC #/AREA URNS HPF: 17 /HPF (ref 0–5)

## 2023-10-05 PROCEDURE — 96374 THER/PROPH/DIAG INJ IV PUSH: CPT

## 2023-10-05 PROCEDURE — 81001 URINALYSIS AUTO W/SCOPE: CPT | Performed by: STUDENT IN AN ORGANIZED HEALTH CARE EDUCATION/TRAINING PROGRAM

## 2023-10-05 PROCEDURE — 85025 COMPLETE CBC W/AUTO DIFF WBC: CPT | Performed by: STUDENT IN AN ORGANIZED HEALTH CARE EDUCATION/TRAINING PROGRAM

## 2023-10-05 PROCEDURE — 81025 URINE PREGNANCY TEST: CPT | Performed by: STUDENT IN AN ORGANIZED HEALTH CARE EDUCATION/TRAINING PROGRAM

## 2023-10-05 PROCEDURE — 93005 ELECTROCARDIOGRAM TRACING: CPT | Performed by: INTERNAL MEDICINE

## 2023-10-05 PROCEDURE — 25000003 PHARM REV CODE 250: Performed by: EMERGENCY MEDICINE

## 2023-10-05 PROCEDURE — 87086 URINE CULTURE/COLONY COUNT: CPT | Performed by: STUDENT IN AN ORGANIZED HEALTH CARE EDUCATION/TRAINING PROGRAM

## 2023-10-05 PROCEDURE — 93010 ELECTROCARDIOGRAM REPORT: CPT | Mod: ,,, | Performed by: INTERNAL MEDICINE

## 2023-10-05 PROCEDURE — 99285 EMERGENCY DEPT VISIT HI MDM: CPT | Mod: 25

## 2023-10-05 PROCEDURE — 96375 TX/PRO/DX INJ NEW DRUG ADDON: CPT

## 2023-10-05 PROCEDURE — 96372 THER/PROPH/DIAG INJ SC/IM: CPT | Mod: 59 | Performed by: EMERGENCY MEDICINE

## 2023-10-05 PROCEDURE — U0002 COVID-19 LAB TEST NON-CDC: HCPCS | Performed by: EMERGENCY MEDICINE

## 2023-10-05 PROCEDURE — 63600175 PHARM REV CODE 636 W HCPCS: Performed by: EMERGENCY MEDICINE

## 2023-10-05 PROCEDURE — 96361 HYDRATE IV INFUSION ADD-ON: CPT

## 2023-10-05 PROCEDURE — 93010 EKG 12-LEAD: ICD-10-PCS | Mod: ,,, | Performed by: INTERNAL MEDICINE

## 2023-10-05 PROCEDURE — 80053 COMPREHEN METABOLIC PANEL: CPT | Performed by: STUDENT IN AN ORGANIZED HEALTH CARE EDUCATION/TRAINING PROGRAM

## 2023-10-05 RX ORDER — KETOROLAC TROMETHAMINE 10 MG/1
10 TABLET, FILM COATED ORAL EVERY 6 HOURS PRN
Qty: 12 TABLET | Refills: 0 | Status: SHIPPED | OUTPATIENT
Start: 2023-10-05 | End: 2023-10-08

## 2023-10-05 RX ORDER — DIPHENHYDRAMINE HYDROCHLORIDE 50 MG/ML
25 INJECTION INTRAMUSCULAR; INTRAVENOUS
Status: COMPLETED | OUTPATIENT
Start: 2023-10-05 | End: 2023-10-05

## 2023-10-05 RX ORDER — PROMETHAZINE HYDROCHLORIDE 25 MG/1
25 TABLET ORAL EVERY 6 HOURS PRN
Qty: 15 TABLET | Refills: 0 | Status: SHIPPED | OUTPATIENT
Start: 2023-10-05 | End: 2024-03-08 | Stop reason: ALTCHOICE

## 2023-10-05 RX ORDER — ONDANSETRON 2 MG/ML
4 INJECTION INTRAMUSCULAR; INTRAVENOUS
Status: COMPLETED | OUTPATIENT
Start: 2023-10-05 | End: 2023-10-05

## 2023-10-05 RX ORDER — BUTALBITAL, ACETAMINOPHEN AND CAFFEINE 50; 325; 40 MG/1; MG/1; MG/1
1 TABLET ORAL EVERY 4 HOURS PRN
Qty: 30 TABLET | Refills: 0 | Status: SHIPPED | OUTPATIENT
Start: 2023-10-05 | End: 2023-11-04

## 2023-10-05 RX ORDER — KETOROLAC TROMETHAMINE 30 MG/ML
30 INJECTION, SOLUTION INTRAMUSCULAR; INTRAVENOUS ONCE
Status: COMPLETED | OUTPATIENT
Start: 2023-10-05 | End: 2023-10-05

## 2023-10-05 RX ORDER — KETOROLAC TROMETHAMINE 30 MG/ML
30 INJECTION, SOLUTION INTRAMUSCULAR; INTRAVENOUS
Status: DISCONTINUED | OUTPATIENT
Start: 2023-10-05 | End: 2023-10-05

## 2023-10-05 RX ORDER — PROCHLORPERAZINE EDISYLATE 5 MG/ML
10 INJECTION INTRAMUSCULAR; INTRAVENOUS
Status: COMPLETED | OUTPATIENT
Start: 2023-10-05 | End: 2023-10-05

## 2023-10-05 RX ADMIN — PROCHLORPERAZINE EDISYLATE 10 MG: 5 INJECTION INTRAMUSCULAR; INTRAVENOUS at 04:10

## 2023-10-05 RX ADMIN — SODIUM CHLORIDE 1000 ML: 0.9 INJECTION, SOLUTION INTRAVENOUS at 04:10

## 2023-10-05 RX ADMIN — KETOROLAC TROMETHAMINE 30 MG: 30 INJECTION, SOLUTION INTRAMUSCULAR at 04:10

## 2023-10-05 RX ADMIN — ONDANSETRON 4 MG: 2 INJECTION INTRAMUSCULAR; INTRAVENOUS at 05:10

## 2023-10-05 RX ADMIN — DIPHENHYDRAMINE HYDROCHLORIDE 25 MG: 50 INJECTION INTRAMUSCULAR; INTRAVENOUS at 04:10

## 2023-10-05 NOTE — FIRST PROVIDER EVALUATION
"Medical screening examination initiated.  I have conducted a focused provider triage encounter, findings are as follows:    Brief history of present illness:  x3 days headache without relief despite triptans, c/o visual blurriness, c/o pre-syncope    Vitals:    10/05/23 1449   BP: (!) 142/107   BP Location: Left arm   Patient Position: Sitting   Pulse: 82   Resp: 20   Temp: 98.3 °F (36.8 °C)   TempSrc: Oral   SpO2: 99%   Weight: 97.5 kg (215 lb)   Height: 5' 8" (1.727 m)       Pertinent physical exam:  gross neuro intact, nontoxic    Brief workup plan:  labs, ekg, ct head    Preliminary workup initiated; this workup will be continued and followed by the physician or advanced practice provider that is assigned to the patient when roomed.  "

## 2023-10-05 NOTE — Clinical Note
"Candelario Veleztorito Nolan was seen and treated in our emergency department on 10/5/2023.  She may return to work on 10/06/2023.       If you have any questions or concerns, please don't hesitate to call.      Raul Lipscomb MD"

## 2023-10-06 NOTE — ED PROVIDER NOTES
Encounter Date: 10/5/2023       History     Chief Complaint   Patient presents with    Headache     Frontal H/A radiating to right parietal/occipital aspect, throbbing pain x 3 days. Not resolved with triptans and meclizine given by MD. Told to come to ED. Worst h/a ever, never diagnosed with migraines. Never seen Neuro. Having auras and photosensitivity      23-year-old female presenting with headache.  This headache has been present the last several days.  It it was waxing/waning, severe at times.  She is associated nausea and vomiting and photophobia.  No fever or neck stiffness.  She does have history of headaches but never this severe or persistent.  She is never seen Neurology.  Her PCP prescribed her Imitrex yesterday but she reports no improvement with this.    The history is provided by the patient.     Review of patient's allergies indicates:   Allergen Reactions    Azithromycin Hives    Amoxicillin     Wellbutrin [bupropion hcl] Other (See Comments)     Dizziness, near syncope     Past Medical History:   Diagnosis Date    Hypertension      Past Surgical History:   Procedure Laterality Date    FOOT SURGERY Right     fracture repair     Family History   Problem Relation Age of Onset    Asthma Mother     Allergies Mother     Thyroid disease Father         hypothyroidism    Cancer Paternal Grandmother         lung cancer    Hypertension Paternal Grandfather     Diabetes Paternal Grandfather      Social History     Tobacco Use    Smoking status: Never    Smokeless tobacco: Never   Substance Use Topics    Alcohol use: No    Drug use: No     Review of Systems   Gastrointestinal:  Positive for nausea.   Neurological:  Positive for headaches.   All other systems reviewed and are negative.      Physical Exam     Initial Vitals [10/05/23 1449]   BP Pulse Resp Temp SpO2   (!) 142/107 82 20 98.3 °F (36.8 °C) 99 %      MAP       --         Physical Exam    Nursing note and vitals reviewed.  Constitutional: She appears  well-developed and well-nourished. She is not diaphoretic. No distress.   HENT:   Head: Normocephalic.   Eyes: Conjunctivae are normal.   Neck: Neck supple.   No nuchal rigidity   Normal range of motion.  Cardiovascular:  Normal rate.           Pulmonary/Chest: No respiratory distress.   Abdominal: She exhibits no distension.   Musculoskeletal:         General: No edema.      Cervical back: Normal range of motion and neck supple.     Neurological: She is alert and oriented to person, place, and time. She has normal strength. No cranial nerve deficit or sensory deficit. GCS eye subscore is 4. GCS verbal subscore is 5. GCS motor subscore is 6.   Skin: Skin is warm and dry.   Psychiatric: She has a normal mood and affect.         ED Course   Procedures  Labs Reviewed   URINALYSIS, REFLEX TO URINE CULTURE - Abnormal; Notable for the following components:       Result Value    Appearance, UA Hazy (*)     Protein, UA Trace (*)     Ketones, UA 1+ (*)     Urobilinogen, UA >=8.0 (*)     Leukocytes, UA 1+ (*)     All other components within normal limits    Narrative:     Specimen Source->Urine   URINALYSIS MICROSCOPIC - Abnormal; Notable for the following components:    WBC, UA 17 (*)     Hyaline Casts, UA 14 (*)     All other components within normal limits    Narrative:     Specimen Source->Urine   CULTURE, URINE   CBC W/ AUTO DIFFERENTIAL   COMPREHENSIVE METABOLIC PANEL   SARS-COV-2 RNA AMPLIFICATION, QUAL   POCT URINE PREGNANCY          Imaging Results              CT Head Without Contrast (Final result)  Result time 10/05/23 18:32:19      Final result by Ramiro Frey MD (10/05/23 18:32:19)                   Narrative:      EXAM: CT HEAD WITHOUT CONTRAST    HISTORY: Subarachnoid hemorrhage (SAH) suspected    COMPARISON: CT scan of the head dated 8/9/2017    TECHNIQUE: Multiple axial 3 mm thick images were acquired from the base of the skull to the vertex without IV contrast.    This exam was performed according to  our departmental dose-optimization program, which includes automated exposure control, adjustment of the mA and/or kV according to patient size and/or use of iterative reconstruction technique.    Unless otherwise stated, incidental findings do not require dedicated follow-up imaging.    FINDINGS: The brain and ventricular system appear structurally normal. There is no evidence of recent or old infarct or hemorrhage. There is no mass effect. There is no cerebral edema. The visualized paranasal sinuses and mastoid air cells and middle ear cavities are well aerated.. Bone window images demonstrate no osseous abnormalities.    IMPRESSION:   Normal CT scan of the head.    Electronically signed by:  Ramiro Frey MD  10/05/2023 06:32 PM CDT Workstation: [id number]                                     Medications   prochlorperazine injection Soln 10 mg (10 mg Intravenous Given 10/5/23 1636)   diphenhydrAMINE injection 25 mg (25 mg Intravenous Given 10/5/23 1631)   sodium chloride 0.9% bolus 1,000 mL 1,000 mL (0 mLs Intravenous Stopped 10/5/23 1737)   ketorolac injection 30 mg (30 mg Intramuscular Given 10/5/23 1656)   ondansetron injection 4 mg (4 mg Intravenous Given 10/5/23 1700)     Medical Decision Making  23-year-old with headache, no history of migraines.  She is afebrile with no nuchal rigidity and my suspicion for meningitis is low.  CT head was obtained which is negative for intracranial hemorrhage or mass.  Patient was treated with IV Compazine, Benadryl, IV fluid bolus, IM Toradol.  Upon re-evaluation she feels much better with resolution of her headache.  I will discharge home with Fioricet and Toradol and refer to neurology clinic.    Risk  Prescription drug management.                               Clinical Impression:   Final diagnoses:  [R53.83] Fatigue  [G43.809] Other migraine without status migrainosus, not intractable (Primary)        ED Disposition Condition    Discharge Stable          ED  Prescriptions       Medication Sig Dispense Start Date End Date Auth. Provider    butalbital-acetaminophen-caffeine -40 mg (FIORICET, ESGIC) -40 mg per tablet Take 1 tablet by mouth every 4 (four) hours as needed for Pain. 30 tablet 10/5/2023 11/4/2023 Raul Lipscomb MD    ketorolac (TORADOL) 10 mg tablet Take 1 tablet (10 mg total) by mouth every 6 (six) hours as needed for Pain. 12 tablet 10/5/2023 10/8/2023 Raul Lipscomb MD    promethazine (PHENERGAN) 25 MG tablet Take 1 tablet (25 mg total) by mouth every 6 (six) hours as needed for Nausea. 15 tablet 10/5/2023 -- Raul Lipscomb MD          Follow-up Information       Follow up With Specialties Details Why Contact Info    Amado Jimenez MD Neurology   [address] [address]  [phone]               Raul Lipscomb MD  10/05/23 2121

## 2023-10-06 NOTE — DISCHARGE INSTRUCTIONS
Return to the ER for worsening symptoms or for any other concerns.  Follow-up with your PCP and neurology clinic for re-evaluation.

## 2023-10-07 LAB
BACTERIA UR CULT: NORMAL
BACTERIA UR CULT: NORMAL

## 2023-10-09 ENCOUNTER — OCCUPATIONAL HEALTH (OUTPATIENT)
Dept: URGENT CARE | Facility: CLINIC | Age: 23
End: 2023-10-09

## 2023-10-09 DIAGNOSIS — Z13.9 ENCOUNTER FOR SCREENING: Primary | ICD-10-CM

## 2023-10-09 PROCEDURE — 80305 PR NON-DOT DRUG SCREENS: ICD-10-PCS | Mod: S$GLB,,, | Performed by: EMERGENCY MEDICINE

## 2023-10-09 PROCEDURE — 80305 DRUG TEST PRSMV DIR OPT OBS: CPT | Mod: S$GLB,,, | Performed by: EMERGENCY MEDICINE

## 2023-12-31 ENCOUNTER — OFFICE VISIT (OUTPATIENT)
Dept: URGENT CARE | Facility: CLINIC | Age: 23
End: 2023-12-31
Payer: OTHER GOVERNMENT

## 2023-12-31 VITALS
BODY MASS INDEX: 33.8 KG/M2 | WEIGHT: 223 LBS | HEIGHT: 68 IN | RESPIRATION RATE: 17 BRPM | SYSTOLIC BLOOD PRESSURE: 132 MMHG | TEMPERATURE: 98 F | DIASTOLIC BLOOD PRESSURE: 95 MMHG | OXYGEN SATURATION: 97 % | HEART RATE: 74 BPM

## 2023-12-31 DIAGNOSIS — Z20.828 EXPOSURE TO INFLUENZA: Primary | ICD-10-CM

## 2023-12-31 DIAGNOSIS — R05.9 COUGH, UNSPECIFIED TYPE: ICD-10-CM

## 2023-12-31 DIAGNOSIS — R09.81 NASAL CONGESTION: ICD-10-CM

## 2023-12-31 LAB
CTP QC/QA: YES
FLUAV AG NPH QL: NEGATIVE
FLUBV AG NPH QL: NEGATIVE

## 2023-12-31 PROCEDURE — 87804 INFLUENZA ASSAY W/OPTIC: CPT | Mod: QW,,,

## 2023-12-31 PROCEDURE — 99204 OFFICE O/P NEW MOD 45 MIN: CPT | Mod: S$GLB,,,

## 2023-12-31 PROCEDURE — 87804 POCT INFLUENZA A/B: ICD-10-PCS | Mod: QW,,,

## 2023-12-31 PROCEDURE — 99204 PR OFFICE/OUTPT VISIT, NEW, LEVL IV, 45-59 MIN: ICD-10-PCS | Mod: S$GLB,,,

## 2023-12-31 RX ORDER — OSELTAMIVIR PHOSPHATE 75 MG/1
75 CAPSULE ORAL 2 TIMES DAILY
Qty: 10 CAPSULE | Refills: 0 | Status: SHIPPED | OUTPATIENT
Start: 2023-12-31 | End: 2024-01-05

## 2023-12-31 NOTE — PROGRESS NOTES
"Subjective:      Patient ID: Candelario Nolan is a 23 y.o. female.    Vitals:  height is 5' 8" (1.727 m) and weight is 101.2 kg (223 lb). Her oral temperature is 98.2 °F (36.8 °C). Her blood pressure is 132/95 (abnormal) and her pulse is 74. Her respiration is 17 and oxygen saturation is 97%.     Chief Complaint: Cough, Nasal Congestion, and Eye Problem    Pt states that her symptoms started today. Patient states that her symptoms are the following: nasal congestion, cough, bilateral eye itching, patient family all have the flu.       Constitution: Positive for chills and fatigue. Negative for fever.   HENT:  Positive for congestion, postnasal drip, sinus pressure and sore throat.    Neck: neck negative.   Cardiovascular: Negative.    Eyes:  Positive for eye discharge (watery). Negative for eye pain and eye redness.   Respiratory:  Positive for cough. Negative for shortness of breath and wheezing.    Gastrointestinal: Negative.    Endocrine: negative.   Musculoskeletal:  Positive for muscle ache.   Skin: Negative.    Neurological:  Negative for headaches.   Psychiatric/Behavioral: Negative.        Objective:     Physical Exam   Constitutional: She is oriented to person, place, and time. She appears well-developed. She is cooperative.  Non-toxic appearance. She does not appear ill. No distress.   HENT:   Head: Normocephalic and atraumatic.   Ears:   Right Ear: Hearing, tympanic membrane, external ear and ear canal normal.   Left Ear: Hearing, tympanic membrane, external ear and ear canal normal.   Nose: Rhinorrhea and congestion present. No mucosal edema or nasal deformity. No epistaxis. Right sinus exhibits no maxillary sinus tenderness and no frontal sinus tenderness. Left sinus exhibits no maxillary sinus tenderness and no frontal sinus tenderness.   Mouth/Throat: Uvula is midline and mucous membranes are normal. Mucous membranes are moist. No trismus in the jaw. Normal dentition. No uvula swelling. Posterior " oropharyngeal erythema present. No posterior oropharyngeal edema.   Eyes: Conjunctivae and lids are normal.   Neck: Trachea normal and phonation normal. Neck supple. No edema present. No erythema present. No neck rigidity present.   Cardiovascular: Normal rate, regular rhythm and normal heart sounds.   Pulmonary/Chest: Effort normal and breath sounds normal. No respiratory distress. She has no decreased breath sounds. She has no wheezes. She has no rhonchi.   Abdominal: Normal appearance.   Musculoskeletal: Normal range of motion.         General: No deformity. Normal range of motion.   Neurological: She is alert and oriented to person, place, and time. She displays no weakness. She exhibits normal muscle tone.   Skin: Skin is warm, dry, intact, not diaphoretic and not pale.   Psychiatric: Her speech is normal and behavior is normal. Mood, judgment and thought content normal.   Nursing note and vitals reviewed.      Assessment:     1. Exposure to influenza    2. Cough, unspecified type    3. Nasal congestion        Plan:       Exposure to influenza  -     POCT Influenza A/B Rapid Antigen  -     oseltamivir (TAMIFLU) 75 MG capsule; Take 1 capsule (75 mg total) by mouth 2 (two) times daily. for 5 days  Dispense: 10 capsule; Refill: 0    Cough, unspecified type  -     POCT Influenza A/B Rapid Antigen  -     pyrilamine-phenylephrine-DM 12.5-5-7.5 mg/5 mL Liqd; Take 10 mLs by mouth every 6 (six) hours as needed (cough).  Dispense: 350 mL; Refill: 0    Nasal congestion  -     pyrilamine-phenylephrine-DM 12.5-5-7.5 mg/5 mL Liqd; Take 10 mLs by mouth every 6 (six) hours as needed (cough).  Dispense: 350 mL; Refill: 0      FLU: neg    Suspected flu, known exposure, and symptomatic. Treating with tamiflu.   Discussed medication with patient who acknowledges understanding and is agreeable to POC. Follow up with primary care. Increase fluid intake. Red flags for ER discussed.

## 2024-01-04 DIAGNOSIS — R19.09 GROIN SWELLING: Primary | ICD-10-CM

## 2024-01-04 DIAGNOSIS — R10.2 PELVIC PAIN SYNDROME: ICD-10-CM

## 2024-01-04 LAB
PAP RECOMMENDATION EXT: NORMAL
PAP SMEAR: NORMAL

## 2024-01-05 ENCOUNTER — HOSPITAL ENCOUNTER (OUTPATIENT)
Dept: RADIOLOGY | Facility: HOSPITAL | Age: 24
Discharge: HOME | End: 2024-01-05
Attending: STUDENT IN AN ORGANIZED HEALTH CARE EDUCATION/TRAINING PROGRAM
Payer: OTHER GOVERNMENT

## 2024-01-05 DIAGNOSIS — R19.09 GROIN SWELLING: ICD-10-CM

## 2024-01-05 DIAGNOSIS — R10.2 PELVIC PAIN SYNDROME: ICD-10-CM

## 2024-01-05 PROCEDURE — 76830 TRANSVAGINAL US NON-OB: CPT | Mod: TC,PO

## 2024-03-06 ENCOUNTER — TELEPHONE (OUTPATIENT)
Dept: DERMATOLOGY | Facility: CLINIC | Age: 24
End: 2024-03-06
Payer: OTHER GOVERNMENT

## 2024-03-06 NOTE — TELEPHONE ENCOUNTER
Pt asking questions regarding insurance.  Pt referred to the billing dept and phone number provided.

## 2024-03-08 ENCOUNTER — OFFICE VISIT (OUTPATIENT)
Dept: FAMILY MEDICINE | Facility: CLINIC | Age: 24
End: 2024-03-08
Payer: OTHER GOVERNMENT

## 2024-03-08 VITALS
BODY MASS INDEX: 34.75 KG/M2 | HEIGHT: 68 IN | OXYGEN SATURATION: 99 % | HEART RATE: 87 BPM | DIASTOLIC BLOOD PRESSURE: 84 MMHG | SYSTOLIC BLOOD PRESSURE: 132 MMHG | WEIGHT: 229.25 LBS

## 2024-03-08 DIAGNOSIS — M79.605 LEFT LEG PAIN: Primary | ICD-10-CM

## 2024-03-08 DIAGNOSIS — M79.602 LEFT ARM PAIN: ICD-10-CM

## 2024-03-08 DIAGNOSIS — R21 RASH OF FACE: ICD-10-CM

## 2024-03-08 PROCEDURE — 99214 OFFICE O/P EST MOD 30 MIN: CPT | Mod: S$GLB,,, | Performed by: NURSE PRACTITIONER

## 2024-03-08 PROCEDURE — 99999 PR PBB SHADOW E&M-EST. PATIENT-LVL III: CPT | Mod: PBBFAC,,, | Performed by: NURSE PRACTITIONER

## 2024-03-08 RX ORDER — METRONIDAZOLE 7.5 MG/G
GEL TOPICAL NIGHTLY
Qty: 45 G | Refills: 0 | Status: SHIPPED | OUTPATIENT
Start: 2024-03-08 | End: 2025-03-08

## 2024-03-08 RX ORDER — NAPROXEN 500 MG/1
500 TABLET ORAL 2 TIMES DAILY WITH MEALS
Qty: 28 TABLET | Refills: 0 | Status: SHIPPED | OUTPATIENT
Start: 2024-03-08 | End: 2024-03-22

## 2024-03-08 NOTE — PROGRESS NOTES
This dictation has been generated using Modal Fluency Dictation some phonetic errors may occur. Please contact author for clarification if needed.     Problem List Items Addressed This Visit    None  Visit Diagnoses       Left leg pain    -  Primary    Left arm pain        Rash of face                Orders Placed This Encounter    naproxen (NAPROSYN) 500 MG tablet    metroNIDAZOLE (METROGEL) 0.75 % gel     Leg and arm pain.  No evidence of central etiology and not consistent with stroke or an aneurysm.  Suspect some mild regional impingement cervical and lumbar.  Naprosyn as above and monitor.  Considered arthralgia however not localize to a joint.  Considered myalgia however no medications that would cause rhabdo.    Rash of face.  Considered lupus but no other supporting symptoms.  Rash flares with heat exposure treat for a rosacea    No follow-ups on file.    ________________________________________________________________  ________________________________________________________________      Chief Complaint   Patient presents with    Arm Pain    Knee Pain     History of present illness  This 23 y.o. presents today for complaint of extremity pain and rash on face   Patient notes she awoke this morning with pain around the left knee.  She went back to sleep and woke with left leg pain.  She also noted some left arm pain at that time.  Denies any chest pain or shortness a breath.  Has had some back pains in the past but denies any neck pain.  No history of injury.  Patient notes a rash on her face.  It flares with heat exposure.  Denies any itching.  Denies rash elsewhere.  Focal review of system negative except as stated above    Past Medical History:   Diagnosis Date    Hypertension        Past Surgical History:   Procedure Laterality Date    FOOT SURGERY Right     fracture repair       Family History   Problem Relation Age of Onset    Asthma Mother     Allergies Mother     Thyroid disease Father          "hypothyroidism    Cancer Paternal Grandmother         lung cancer    Hypertension Paternal Grandfather     Diabetes Paternal Grandfather        Social History     Socioeconomic History    Marital status: Single   Tobacco Use    Smoking status: Never    Smokeless tobacco: Never   Substance and Sexual Activity    Alcohol use: No    Drug use: No       Current Outpatient Medications   Medication Sig Dispense Refill    clotrimazole-betamethasone 1-0.05% (LOTRISONE) cream Apply topically 2 (two) times daily. 45 g 0    DULoxetine (CYMBALTA) 30 MG capsule TAKE 1 CAPSULE(30 MG) BY MOUTH EVERY DAY 30 capsule 11    metroNIDAZOLE (METROGEL) 0.75 % gel Apply topically every evening. 45 g 0    naproxen (NAPROSYN) 500 MG tablet Take 1 tablet (500 mg total) by mouth 2 (two) times daily with meals. for 14 days 28 tablet 0     No current facility-administered medications for this visit.       Review of patient's allergies indicates:   Allergen Reactions    Azithromycin Hives    Imitrex [sumatriptan] Hives    Amoxicillin     Wellbutrin [bupropion hcl] Other (See Comments)     Dizziness, near syncope       Physical examination  Vitals Reviewed  /84 (BP Location: Right arm, Patient Position: Sitting, BP Method: Medium (Manual))   Pulse 87   Ht 5' 8" (1.727 m)   Wt 104 kg (229 lb 4.5 oz)   SpO2 99%   BMI 34.86 kg/m²  Body mass index is 34.86 kg/m².     BP Readings from Last 3 Encounters:   03/08/24 132/84   12/31/23 (!) 132/95   10/05/23 121/75       Wt Readings from Last 3 Encounters:   03/08/24 104 kg (229 lb 4.5 oz)   12/31/23 101.2 kg (223 lb)   10/05/23 97.5 kg (215 lb)     Gen. Well-dressed well-nourished   Skin warm dry and intact.  Erythematous rash noted over the cheeks.  Chest.  Respirations are even unlabored.  Lungs are clear to auscultation.  Cardiac regular rate and rhythm.  No chest wall adenopathy noted.  Neuro. Awake alert oriented x4.  Normal judgment and cognition noted.  Straight leg raise bilateral is " negative but decreased range of motion on the left. Spurling's equivocal.   Extremities no clubbing cyanosis or edema noted.  Left knee stable examination.  No fluid noted.  No crepitus noted with range of motion.  Fairly normal spinal alignment.  Forward bending approaching the floor.  Neck full range of motion flexion-extension rotation.    Call or return to clinic prn if these symptoms worsen or fail to improve as anticipated.

## 2024-05-18 DIAGNOSIS — F41.9 ANXIETY: ICD-10-CM

## 2024-05-20 RX ORDER — DULOXETIN HYDROCHLORIDE 30 MG/1
CAPSULE, DELAYED RELEASE ORAL
Qty: 30 CAPSULE | Refills: 0 | Status: SHIPPED | OUTPATIENT
Start: 2024-05-20 | End: 2024-06-17

## 2024-06-17 DIAGNOSIS — F41.9 ANXIETY: ICD-10-CM

## 2024-06-17 RX ORDER — DULOXETIN HYDROCHLORIDE 30 MG/1
CAPSULE, DELAYED RELEASE ORAL
Qty: 30 CAPSULE | Refills: 0 | Status: SHIPPED | OUTPATIENT
Start: 2024-06-17 | End: 2024-06-20 | Stop reason: SDUPTHER

## 2024-06-20 ENCOUNTER — OFFICE VISIT (OUTPATIENT)
Dept: FAMILY MEDICINE | Facility: CLINIC | Age: 24
End: 2024-06-20
Payer: OTHER GOVERNMENT

## 2024-06-20 VITALS
WEIGHT: 230.63 LBS | OXYGEN SATURATION: 98 % | DIASTOLIC BLOOD PRESSURE: 80 MMHG | BODY MASS INDEX: 34.95 KG/M2 | SYSTOLIC BLOOD PRESSURE: 118 MMHG | HEART RATE: 79 BPM | TEMPERATURE: 98 F | HEIGHT: 68 IN

## 2024-06-20 DIAGNOSIS — F41.9 ANXIETY AND DEPRESSION: ICD-10-CM

## 2024-06-20 DIAGNOSIS — F17.211 CIGARETTE NICOTINE DEPENDENCE IN REMISSION: ICD-10-CM

## 2024-06-20 DIAGNOSIS — Z13.6 SCREENING, ISCHEMIC HEART DISEASE: ICD-10-CM

## 2024-06-20 DIAGNOSIS — Z11.8 SCREENING FOR CHLAMYDIAL DISEASE: ICD-10-CM

## 2024-06-20 DIAGNOSIS — F32.A ANXIETY AND DEPRESSION: ICD-10-CM

## 2024-06-20 DIAGNOSIS — Z76.89 ENCOUNTER TO ESTABLISH CARE: Primary | ICD-10-CM

## 2024-06-20 DIAGNOSIS — Z11.4 ENCOUNTER FOR SCREENING FOR HIV: ICD-10-CM

## 2024-06-20 DIAGNOSIS — Z13.1 SCREENING FOR DIABETES MELLITUS: ICD-10-CM

## 2024-06-20 DIAGNOSIS — E66.09 CLASS 2 OBESITY DUE TO EXCESS CALORIES WITHOUT SERIOUS COMORBIDITY WITH BODY MASS INDEX (BMI) OF 35.0 TO 35.9 IN ADULT: ICD-10-CM

## 2024-06-20 DIAGNOSIS — Z11.59 ENCOUNTER FOR HEPATITIS C SCREENING TEST FOR LOW RISK PATIENT: ICD-10-CM

## 2024-06-20 PROCEDURE — 99214 OFFICE O/P EST MOD 30 MIN: CPT | Mod: S$GLB,,, | Performed by: STUDENT IN AN ORGANIZED HEALTH CARE EDUCATION/TRAINING PROGRAM

## 2024-06-20 PROCEDURE — 99999 PR PBB SHADOW E&M-EST. PATIENT-LVL III: CPT | Mod: PBBFAC,,, | Performed by: STUDENT IN AN ORGANIZED HEALTH CARE EDUCATION/TRAINING PROGRAM

## 2024-06-20 RX ORDER — DULOXETIN HYDROCHLORIDE 20 MG/1
CAPSULE, DELAYED RELEASE ORAL
Qty: 25 CAPSULE | Refills: 0 | Status: SHIPPED | OUTPATIENT
Start: 2024-06-20 | End: 2024-08-01

## 2024-06-20 NOTE — PROGRESS NOTES
[facility] Note    Subjective:  Chief Complaint:   Chief Complaint   Patient presents with    Establish Care       History of Present Illness:  Candelario is here for establishing care   Feels well today     Anxiety /depression  Duloxetine 30   Stable and interested in tapering off     H/o Tobacco use    Recommend COVID, HPV vaccinations.     Screening  Health Maintenance         Date Due Completion Date    Hepatitis C Screening Never done ---    Lipid Panel Never done ---    HIV Screening Never done ---    Chlamydia Screening Never done ---    HPV Vaccines (1 - 3-dose series) Never done ---    TETANUS VACCINE Never done ---    COVID-19 Vaccine (3 - 2023-24 season) 09/01/2023 8/27/2022    Influenza Vaccine (Season Ended) 09/01/2024 10/26/2020    Pap Smear 01/04/2029 1/4/2024          Immunization History   Administered Date(s) Administered    Covid-19, Subunit, Rs-nanoparticle+matrix-m1 Adjuvant, Pf (Novavax) 08/06/2022, 08/27/2022    DTaP 1999, 01/21/2000, 04/03/2000, 03/21/2001, 09/22/2003    HIB 1999, 01/21/2000, 01/08/2001    Hepatitis B, Pediatric/Adolescent 1999, 01/21/2000, 01/08/2001    IPV 1999, 01/21/2000, 01/08/2001, 09/22/2003    Influenza 11/27/2004, 10/06/2005    Influenza - Quadrivalent - MDCK - PF 10/21/2017, 11/11/2019    Influenza - Quadrivalent - PF *Preferred* (6 months and older) 10/15/2016, 01/11/2019, 10/26/2020    MMR 09/27/2000, 09/22/2003    Meningococcal Conjugate (MCV4P) 06/29/2017    PPD Test 12/05/2022    Pneumococcal Conjugate - 7 Valent 09/27/2000, 12/07/2000, 03/21/2001    Varicella 09/27/2000     The ASCVD Risk score (Ricci WELSH, et al., 2019) failed to calculate for the following reasons:    The 2019 ASCVD risk score is only valid for ages 40 to 79    Allergies:  Review of patient's allergies indicates:   Allergen Reactions    Azithromycin Hives    Imitrex [sumatriptan] Hives    Amoxicillin     Wellbutrin [bupropion hcl] Other (See Comments)      Dizziness, near syncope       Home Medications:  Current Outpatient Medications on File Prior to Visit   Medication Sig    clotrimazole-betamethasone 1-0.05% (LOTRISONE) cream Apply topically 2 (two) times daily.    metroNIDAZOLE (METROGEL) 0.75 % gel Apply topically every evening.    [DISCONTINUED] DULoxetine (CYMBALTA) 30 MG capsule TAKE 1 CAPSULE(30 MG) BY MOUTH EVERY DAY     No current facility-administered medications on file prior to visit.       Past Medical History:   Diagnosis Date    Hypertension      Past Surgical History:   Procedure Laterality Date    FOOT SURGERY Right     fracture repair     Family History   Problem Relation Name Age of Onset    Asthma Mother      Allergies Mother      Thyroid disease Father          hypothyroidism    Cancer Paternal Grandmother          lung cancer    Hypertension Paternal Grandfather      Diabetes Paternal Grandfather       Social History     Tobacco Use    Smoking status: Never    Smokeless tobacco: Never   Substance Use Topics    Alcohol use: No    Drug use: No            The patient's past medical history, surgical history, social history, family history, allergies and medications have been reviewed.    Review of Systems     10 point review of systems was conducted and only the pertinent positives and pertinent negatives are noted above in the HPI section.    Physical Examination  General appearance: alert, cooperative, no distress  HEENT: normocephalic, atraumatic, PERRLA   Neck: trachea midline, no LAD, no thyromegaly, no neck stiffness  Lungs: clear to auscultation, no wheezes, rales or rhonchi, symmetric air entry  Heart: normal rate, regular rhythm, normal S1, S2, no murmurs, rubs, clicks or gallops  Skin: No rashes or abnormal skin lesions, no apparent jaundice or bruising  Extremities: Full ROM of all extremities, peripheral pulses normal, no unilateral leg swelling or calf tenderness   Neurological:alert, oriented, normal speech, no new focal findings or  "movement disorder noted from baseline      BP Readings from Last 3 Encounters:   06/20/24 118/80   03/08/24 132/84   12/31/23 (!) 132/95     Wt Readings from Last 3 Encounters:   06/20/24 104.6 kg (230 lb 9.6 oz)   03/08/24 104 kg (229 lb 4.5 oz)   12/31/23 101.2 kg (223 lb)     /80 (BP Location: Left arm, Patient Position: Sitting, BP Method: Medium (Manual))   Pulse 79   Temp 98.3 °F (36.8 °C) (Oral)   Ht 5' 8" (1.727 m)   Wt 104.6 kg (230 lb 9.6 oz)   SpO2 98%   BMI 35.06 kg/m²    274}  Laboratory: I have reviewed old labs below:    274}    Lab Results   Component Value Date    WBC 5.77 10/05/2023    HGB 15.1 10/05/2023    HCT 45.4 10/05/2023    MCV 89 10/05/2023     10/05/2023     10/05/2023    K 3.8 10/05/2023     10/05/2023    CALCIUM 9.3 10/05/2023    CO2 26 10/05/2023    GLU 89 10/05/2023    BUN 8 10/05/2023    CREATININE 0.7 10/05/2023    EGFRNORACEVR >60.0 10/05/2023    ANIONGAP 8 10/05/2023    PROT 7.6 10/05/2023    ALBUMIN 4.6 10/05/2023    BILITOT 0.6 10/05/2023    ALKPHOS 60 10/05/2023    ALT 22 10/05/2023    AST 20 10/05/2023    TSH 0.767 11/02/2018      Lab reviewed by me: Particular labs of significance that I will monitor, workup, or treat to improve are mentioned below in diagnostic impression remarks.    Imaging/EKG: I have reviewed the pertinent results and my findings are noted in remarks.  274}    CC:   Chief Complaint   Patient presents with    Establish Care        274}    Assessment/Plan  Candelario Nolan is a 23 y.o. female who presents to clinic with:  1. Encounter to establish care    2. Anxiety and depression    3. Cigarette nicotine dependence in remission    4. Class 2 obesity due to excess calories without serious comorbidity with body mass index (BMI) of 35.0 to 35.9 in adult    5. Screening for chlamydial disease    6. Encounter for screening for HIV    7. Encounter for hepatitis C screening test for low risk patient    8. Screening, ischemic heart disease "    9. Screening for diabetes mellitus       274}  Diagnostic Impression Remarks       23 y.o. female who presents to clinic today for est care    This is the extent of this pleasant patient's concerns at this present time.  I also discussed the importance of close follow up to discuss labs, change or modify her medications if needed, monitor side effects, and further evaluation of medical problems.     Additional workup planned: see labs ordered below.    See below for labs and meds ordered with associated diagnosis      1. Encounter to establish care  - CBC Auto Differential; Future  - Comprehensive Metabolic Panel; Future  - Hemoglobin A1C; Future  - Lipid Panel; Future  - TSH; Future  - Hepatitis C Antibody; Future  - HIV 1/2 Ag/Ab (4th Gen); Future  - C. trachomatis/N. gonorrhoeae by AMP DNA; Future    2. Anxiety and depression  - DULoxetine (CYMBALTA) 20 MG capsule; Take 1 capsule (20 mg total) by mouth once daily for 14 days, THEN 1 capsule (20 mg total) every other day for 14 days, THEN 1 capsule (20 mg total) twice a week for 14 days.  Dispense: 25 capsule; Refill: 0  - TSH; Future  Taper as directed, patient to reach out if unable to tolerate and can readjust for slower taper     3. Cigarette nicotine dependence in remission  Quit 10 months ago. No indication for further screening     4. Class 2 obesity due to excess calories without serious comorbidity with body mass index (BMI) of 35.0 to 35.9 in adult  - CBC Auto Differential; Future  - Comprehensive Metabolic Panel; Future  - Hemoglobin A1C; Future  - Lipid Panel; Future  - TSH; Future    I recommend the following therapeutic lifestyle changes:     Transition to a low salt, primarily plant-based diet which emphasizes:  Increase fiber with vegetables, whole grains, legumes   Increase lean protein by eating beans, legumes, fish, poultry, eggs, bison  Good dairy choices for lean protein include greek yogurt (low sugar options) and cottage cheese  Replacing  butter with healthy fats, such as olive oil and nuts  Using herbs and spices instead of salt to flavor foods   Limiting red meat to no more than a few times a month   Limit added sugars (sodas, fruit juices, fancy coffee drinks)  Limit processed foods      Initiation of a graded aerobic exercise plan (goal 30-45 minutes per day 4-5 times per week)  Patient encouraged to participate in low intensity strength exercising and if unfamiliar with strength and conditioning training, I recommend joining a gym with access to a  to further instruct the patient.      If weight/obesity is a concern a reasonable weight loss goal of 1-2lbs per month to reach a goal of 5-10% weight loss in 5-6 months, or a BMI less than 25.    5. Screening for chlamydial disease  - C. trachomatis/N. gonorrhoeae by AMP DNA; Future    6. Encounter for screening for HIV  - HIV 1/2 Ag/Ab (4th Gen); Future    7. Encounter for hepatitis C screening test for low risk patient  - Hepatitis C Antibody; Future    8. Screening, ischemic heart disease  - Lipid Panel; Future    9. Screening for diabetes mellitus  - Comprehensive Metabolic Panel; Future  - Hemoglobin A1C; Future          RTC annually or prn     Suki Bang MD, [facility]   Family Medicine Physician  06/20/2024      If you are due for any health screening(s) below please notify me so we can arrange them to be ordered and scheduled to maintain your health.     Health Maintenance Due   Topic    Lipid Panel                        The following information is provided to all patients.  This information is to help you find resources for any of the problems found today that may be affecting your health:                Living healthy guide: www.[facility].[place].gov       Understanding Diabetes: www.diabetes.org       Eating healthy: www.cdc.gov/healthyweight      CDC home safety checklist: www.cdc.gov/steadi/patient.html      Agency on Aging: www.goea.[place].gov       Alcoholics  anonymous (AA): www.aa.org      Physical Activity: www.tami.nih.gov/wc8uayt       Tobacco use: www.quitwithusla.org

## 2024-06-20 NOTE — PATIENT INSTRUCTIONS
Broderick Palomino,     If you are due for any health screening(s) below please notify me so we can arrange them to be ordered and scheduled. Most healthy patients at your age complete them, but you are free to accept or refuse.     If you can't do it, I'll definitely understand. If you can, I'd certainly appreciate it!    All of your core healthy metrics are met.

## 2024-07-10 ENCOUNTER — OFFICE VISIT (OUTPATIENT)
Dept: URGENT CARE | Facility: CLINIC | Age: 24
End: 2024-07-10
Payer: OTHER GOVERNMENT

## 2024-07-10 VITALS
HEIGHT: 67 IN | DIASTOLIC BLOOD PRESSURE: 83 MMHG | OXYGEN SATURATION: 99 % | RESPIRATION RATE: 16 BRPM | BODY MASS INDEX: 34.53 KG/M2 | WEIGHT: 220 LBS | HEART RATE: 80 BPM | SYSTOLIC BLOOD PRESSURE: 115 MMHG | TEMPERATURE: 99 F

## 2024-07-10 DIAGNOSIS — B34.9 VIRAL SYNDROME: ICD-10-CM

## 2024-07-10 DIAGNOSIS — J02.9 SORE THROAT: Primary | ICD-10-CM

## 2024-07-10 LAB
CTP QC/QA: YES
FLUAV AG NPH QL: NEGATIVE
FLUBV AG NPH QL: NEGATIVE
S PYO RRNA THROAT QL PROBE: NEGATIVE
SARS-COV-2 AG RESP QL IA.RAPID: NEGATIVE

## 2024-07-10 PROCEDURE — 87811 SARS-COV-2 COVID19 W/OPTIC: CPT | Mod: QW,S$GLB,, | Performed by: NURSE PRACTITIONER

## 2024-07-10 PROCEDURE — 99214 OFFICE O/P EST MOD 30 MIN: CPT | Mod: S$GLB,,, | Performed by: NURSE PRACTITIONER

## 2024-07-10 PROCEDURE — 87880 STREP A ASSAY W/OPTIC: CPT | Mod: QW,,, | Performed by: NURSE PRACTITIONER

## 2024-07-10 PROCEDURE — 87804 INFLUENZA ASSAY W/OPTIC: CPT | Mod: QW,,, | Performed by: NURSE PRACTITIONER

## 2024-07-10 NOTE — LETTER
July 10, 2024      [facility]  [address] [id number]       Patient: Candelario Nolan   YOB: 2000  Date of Visit: 07/10/2024    To Whom It May Concern:    Deepthi Nolan  was at [facility] on 07/10/2024. The patient may return to work/school once symptoms are improving and there has been no fever the preceding 24 hours without fever reducing medications to mask. If you have any questions or concerns, or if I can be of further assistance, please do not hesitate to contact me.    Sincerely,    Corrine Snider, NP

## 2024-07-10 NOTE — PROGRESS NOTES
"Subjective:      Patient ID: Candelario Nolan is a 23 y.o. female.    Vitals:  height is 5' 7" (1.702 m) and weight is 99.8 kg (220 lb). Her temperature is 98.6 °F (37 °C). Her blood pressure is 115/83 and her pulse is 80. Her respiration is 16 and oxygen saturation is 99%.     Chief Complaint: Sore Throat    Pt c/o sore throat, fever tmax of 103.2, body aches x2 days.     Sore Throat   Associated symptoms include congestion and headaches. Pertinent negatives include no diarrhea, ear pain, shortness of breath or vomiting.       Constitution: Positive for appetite change, chills, sweating, fatigue and fever.   HENT:  Positive for congestion and sore throat. Negative for ear pain.    Respiratory:  Negative for chest tightness and shortness of breath.    Gastrointestinal:  Negative for nausea, vomiting and diarrhea.   Musculoskeletal:  Positive for muscle ache.   Neurological:  Positive for headaches.      Objective:     Physical Exam   Constitutional: She is oriented to person, place, and time. She is cooperative.  Non-toxic appearance. She does not appear ill. No distress. obesityawake  HENT:   Head: Normocephalic and atraumatic.   Ears:   Right Ear: Tympanic membrane, external ear and ear canal normal.   Left Ear: Tympanic membrane, external ear and ear canal normal.   Nose: No rhinorrhea or congestion.   Mouth/Throat: Mucous membranes are moist. Posterior oropharyngeal erythema present. No oropharyngeal exudate.   Eyes: Conjunctivae are normal. Right eye exhibits no discharge. Left eye exhibits no discharge.   Neck: Neck supple. No neck rigidity present.   Cardiovascular: Normal rate, regular rhythm and normal heart sounds.   Pulmonary/Chest: Effort normal and breath sounds normal. No accessory muscle usage. No tachypnea. No respiratory distress. She has no wheezes. She has no rhonchi. She has no rales. She exhibits no tenderness.   Abdominal: Normal appearance.   Musculoskeletal:      Cervical back: She exhibits no " tenderness.   Lymphadenopathy:     She has no cervical adenopathy.   Neurological: no focal deficit. She is alert and oriented to person, place, and time. No sensory deficit.   Skin: Skin is warm, dry, not diaphoretic and no rash. Capillary refill takes 2 to 3 seconds.   Psychiatric: Her behavior is normal. Mood normal.   Nursing note and vitals reviewed.      Assessment:     1. Sore throat    2. Viral syndrome      COVID negative  Flu a/B negative  Strep A negative      Plan:       Sore throat  -     SARS Coronavirus 2 Antigen, POCT Manual Read  -     POCT rapid strep A  -     POCT Influenza A/B Rapid Antigen    Viral syndrome

## 2024-07-10 NOTE — PATIENT INSTRUCTIONS
A negative test result does not rule out infection with covid.  Repeat testing at home or at any facility that tests for covid is recommended.  Recommendation is to test a minimum of one more time at least 48 hours from initial negative test.  For symptomatic patients, it is recommended to retest within 3 days and for asymptomatic (without symptoms) it is recommended to retest within 5 days.        Symptomatic treatment to include:    Rest, increase fluid intake to include 50 % water, 50% electrolyte replacement  Ibuprofen/Tylenol as directed for fever, sore throat, headache, body aches.  Tylenol helps with fever but ibuprofen or aleve helps best for other symptoms.   Always take ibuprofen and or Aleve with food as repeated use can cause stomach irritation.  It is also advised to start taking Pepcid 20 mg over-the-counter twice a day for 7-10 days whenever taking NSAIDs for extended times for stomach protection  Zrytec 10 mg, astelin and flonase daily  for sinus symptoms and to reduce inflammation.   Mucinex D over the counter as directed for sinus congestion. Do not use longer than 4-5 days.   Warm, salt water gargles, over the counter throat lozenges or sprays as desires.   Liquid benadryl and maalox 1 to 1 concentration, gargle and spit for temporary relief for sore throat.  ER for difficulty breathing not relieved by rest, excessive lethargy and/or change in mental status  Return to clinic for wheezing, shortness of breath, chest tightness, vomiting for re-evaluation and possible need for additional medications for the symptoms     May return to work/school/public events once symptoms are improving and no fever for 24 hours

## 2024-07-28 DIAGNOSIS — F41.9 ANXIETY AND DEPRESSION: ICD-10-CM

## 2024-07-28 DIAGNOSIS — F32.A ANXIETY AND DEPRESSION: ICD-10-CM

## 2024-07-28 NOTE — TELEPHONE ENCOUNTER
Care Due:                  Date            Visit Type   Department     Provider  --------------------------------------------------------------------------------                                NP -                              PRIMARY      [facility]  Last Visit: 06-      CARE ([facility])   MEDICINE       Suki Bang  Next Visit: None Scheduled  None         None Found                                                            Last  Test          Frequency    Reason                     Performed    Due Date  --------------------------------------------------------------------------------    Cr..........  12 months..  DULoxetine...............  10-   09-    [facility] Embedded Care Due Messages. Reference number: [id number].   7/28/2024 10:09:06 AM CDT

## 2024-07-29 NOTE — TELEPHONE ENCOUNTER
Refill Routing Note   Medication(s) are not appropriate for processing by [facility] for the following reason(s):        Outside of protocol    [facility] action(s):  Route     Requires labs : Yes             Appointments  past 12m or future 3m with PCP    Date Provider   Last Visit   6/20/2024 Suki Bang MD   Next Visit   Visit date not found Suki Bang MD   ED visits in past 90 days: 0        Note composed:2:47 PM 07/29/2024

## 2024-07-30 DIAGNOSIS — F32.A ANXIETY AND DEPRESSION: ICD-10-CM

## 2024-07-30 DIAGNOSIS — F41.9 ANXIETY AND DEPRESSION: ICD-10-CM

## 2024-07-30 RX ORDER — DULOXETIN HYDROCHLORIDE 20 MG/1
CAPSULE, DELAYED RELEASE ORAL
Qty: 25 CAPSULE | Refills: 0 | Status: SHIPPED | OUTPATIENT
Start: 2024-07-30 | End: 2024-09-09

## 2024-07-30 RX ORDER — DULOXETIN HYDROCHLORIDE 20 MG/1
CAPSULE, DELAYED RELEASE ORAL
Qty: 25 CAPSULE | Refills: 0 | OUTPATIENT
Start: 2024-07-30

## 2024-07-30 NOTE — TELEPHONE ENCOUNTER
Patient comment: I messed up the prescription schedule.  Can I get a refill to do it correctly.

## 2024-07-30 NOTE — TELEPHONE ENCOUNTER
No care due was identified.  [facility] Embedded Care Due Messages. Reference number: [id number].   7/30/2024 9:10:18 AM CDT

## 2024-09-18 ENCOUNTER — PATIENT OUTREACH (OUTPATIENT)
Dept: ADMINISTRATIVE | Facility: HOSPITAL | Age: 24
End: 2024-09-18
Payer: OTHER GOVERNMENT

## 2024-09-25 DIAGNOSIS — F41.9 ANXIETY AND DEPRESSION: ICD-10-CM

## 2024-09-25 DIAGNOSIS — F32.A ANXIETY AND DEPRESSION: ICD-10-CM

## 2024-09-25 RX ORDER — DULOXETIN HYDROCHLORIDE 20 MG/1
CAPSULE, DELAYED RELEASE ORAL
Qty: 10 CAPSULE | Refills: 0 | Status: SHIPPED | OUTPATIENT
Start: 2024-09-25 | End: 2024-10-23

## 2024-09-25 NOTE — TELEPHONE ENCOUNTER
No care due was identified.  [facility] Embedded Care Due Messages. Reference number: [id number].   9/25/2024 3:20:34 PM CDT

## 2024-09-25 NOTE — TELEPHONE ENCOUNTER
Requesting refill for   Cymbalta    Last visit  6/20/2024  Next visit  None    Please see patient's comment     Patient Comment: Is there a lower dose possible? I tried weaning, but still need to take some.   Please advise

## 2024-10-21 ENCOUNTER — OFFICE VISIT (OUTPATIENT)
Dept: DERMATOLOGY | Facility: CLINIC | Age: 24
End: 2024-10-21
Payer: OTHER GOVERNMENT

## 2024-10-21 VITALS — BODY MASS INDEX: 34.53 KG/M2 | WEIGHT: 220 LBS | HEIGHT: 67 IN

## 2024-10-21 DIAGNOSIS — L71.9 ROSACEA: Primary | ICD-10-CM

## 2024-10-21 PROCEDURE — 99204 OFFICE O/P NEW MOD 45 MIN: CPT | Mod: S$GLB,,, | Performed by: DERMATOLOGY

## 2024-10-21 RX ORDER — BRIMONIDINE 5 MG/G
GEL TOPICAL
Qty: 30 G | Refills: 5 | Status: SHIPPED | OUTPATIENT
Start: 2024-10-21

## 2024-10-21 RX ORDER — AZELAIC ACID 0.15 G/G
GEL TOPICAL
Qty: 50 G | Refills: 5 | Status: SHIPPED | OUTPATIENT
Start: 2024-10-21

## 2024-10-21 NOTE — PROGRESS NOTES
Subjective:      Patient ID:  Candelario Nolan is a 24 y.o. female who presents for   Chief Complaint   Patient presents with    Skin Discoloration     Face      New Patient    Patient here today for skin discoloration to face x a while.   Pt states areas that are discolored is changing texture.  Itchy when flared and hot  Uses daily SPF (in moisturizer)  Has azzelaic acid OTC cream, helps with redness a little  Gets bumpy    Applying Metrogel and moisturizing with OTC face creams. No resolution.     Derm HX:  Denies Phx of NMSC  Denies Fhx of MM    Current Outpatient Medications:   ·  DULoxetine (CYMBALTA) 20 MG capsule, Take 1 capsule (20 mg total) by mouth 3 (three) times a week for 14 days, THEN 1 capsule (20 mg total) twice a week for 14 days., Disp: 10 capsule, Rfl: 0  ·  metroNIDAZOLE (METROGEL) 0.75 % gel, Apply topically every evening., Disp: 45 g, Rfl: 0  ·  clotrimazole-betamethasone 1-0.05% (LOTRISONE) cream, Apply topically 2 (two) times daily. (Patient not taking: Reported on 7/10/2024), Disp: 45 g, Rfl: 0          Review of Systems   Constitutional:  Negative for fever, chills and fatigue.   Skin:  Positive for daily sunscreen use and activity-related sunscreen use.   Hematologic/Lymphatic: Bruises/bleeds easily.       Objective:   Physical Exam   Constitutional: She appears well-developed and well-nourished.   Neurological: She is alert and oriented to person, place, and time.   Psychiatric: She has a normal mood and affect.   Skin:   Areas Examined (abnormalities noted in diagram):   Head / Face Inspection Performed            Diagram Legend     Erythematous scaling macule/papule c/w actinic keratosis       Vascular papule c/w angioma      Pigmented verrucoid papule/plaque c/w seborrheic keratosis      Yellow umbilicated papule c/w sebaceous hyperplasia      Irregularly shaped tan macule c/w lentigo     1-2 mm smooth white papules consistent with Milia      Movable subcutaneous cyst with punctum c/w  epidermal inclusion cyst      Subcutaneous movable cyst c/w pilar cyst      Firm pink to brown papule c/w dermatofibroma      Pedunculated fleshy papule(s) c/w skin tag(s)      Evenly pigmented macule c/w junctional nevus     Mildly variegated pigmented, slightly irregular-bordered macule c/w mildly atypical nevus      Flesh colored to evenly pigmented papule c/w intradermal nevus       Pink pearly papule/plaque c/w basal cell carcinoma      Erythematous hyperkeratotic cursted plaque c/w SCC      Surgical scar with no sign of skin cancer recurrence      Open and closed comedones      Inflammatory papules and pustules      Verrucoid papule consistent consistent with wart     Erythematous eczematous patches and plaques     Dystrophic onycholytic nail with subungual debris c/w onychomycosis     Umbilicated papule    Erythematous-base heme-crusted tan verrucoid plaque consistent with inflamed seborrheic keratosis     Erythematous Silvery Scaling Plaque c/w Psoriasis     See annotation            Assessment / Plan:        Rosacea  -     brimonidine (MIRVASO) 0.33 % GlwP; Apply thin film to face QAM  Dispense: 30 g; Refill: 5  -     azelaic acid (AZELEX) 15 % gel; Apply to face QHS.  Dispense: 50 g; Refill: 5    Gentle face wash  Uses la roche posay moisturizer  Gentle skin care with unscented hypoallergenic products and strict sun precautions    Discussed triple cream with oxymetazoline and laser vascular treatment           No follow-ups on file.

## 2024-12-09 ENCOUNTER — PATIENT MESSAGE (OUTPATIENT)
Dept: DERMATOLOGY | Facility: CLINIC | Age: 24
End: 2024-12-09
Payer: OTHER GOVERNMENT

## 2024-12-09 DIAGNOSIS — L71.9 ROSACEA: Primary | ICD-10-CM

## 2024-12-09 RX ORDER — IVERMECTIN 10 MG/G
CREAM TOPICAL
Qty: 45 G | Refills: 3 | Status: SHIPPED | OUTPATIENT
Start: 2024-12-09

## 2025-01-30 DIAGNOSIS — R10.30 PAIN IN THE GROIN: Primary | ICD-10-CM

## 2025-02-03 ENCOUNTER — HOSPITAL ENCOUNTER (OUTPATIENT)
Dept: RADIOLOGY | Facility: HOSPITAL | Age: 25
Discharge: HOME | End: 2025-02-03
Payer: OTHER GOVERNMENT

## 2025-02-03 DIAGNOSIS — R10.30 PAIN IN THE GROIN: ICD-10-CM

## 2025-02-03 PROCEDURE — 76856 US EXAM PELVIC COMPLETE: CPT | Mod: 26,,, | Performed by: RADIOLOGY

## 2025-02-03 PROCEDURE — 76856 US EXAM PELVIC COMPLETE: CPT | Mod: TC,PO

## 2025-02-03 PROCEDURE — 76830 TRANSVAGINAL US NON-OB: CPT | Mod: 26,,, | Performed by: RADIOLOGY

## 2025-05-07 NOTE — PROGRESS NOTES
Subjective:       Patient ID: Candelario Nolan is a 24 y.o. female.    Chief Complaint: Memory Loss, Anxiety, and Headache  This is a new patient appointment though already established in the [facility] system as there is a change in physician    She is here to report feelings of depression    Chronic medical issues include the following:-    1.-obesity with BMI  2.-anxiety and depression   3.-history of tobacco use  History of Present Illness    CHIEF COMPLAINT:  Candelario presents with concerns about mood changes and feeling altered in the weeks leading up to her menstrual cycle.    HPI:  Candelario reports mood changes and feeling altered for 1-2 weeks before her menstrual cycle, occurring for the past couple of months. She describes feeling irritable, sad, and dazed. The most recent episode over the past weekend was particularly severe, with daily crying spells. She distinguishes these symptoms from her previous anxiety, stating it feels distinctly different.    Symptoms typically resolve once her menstrual cycle begins, with predictable onset 7-14 days before her cycle starts. Her last menstrual cycle began on May 3rd, with the next one expected around June 1st or 2nd.    Associated symptoms include severe headaches and occasional cramping during her menstrual cycle. Candelario denies heavy bleeding. She reports memory loss during these episodes, feeling disoriented and having difficulty remembering recent events, such as performing daily activities.    Candelario has an IUD for birth control. She has previously been prescribed antidepressants, including Cymbalta (duloxetine), Lexapro, and Wellbutrin (bupropion). Lexapro was ineffective, and she had side effects with Wellbutrin, including feeling lightheaded and dizzy. She is unsure if she developed hives from one of these medications.    Candelario mentions having rosacea, for which she uses ivermectin cream. Her face tends to flush when she is symptomatic, and alcohol can trigger flushing  as well.    Candelario denies chest pain, tightness, or asthma when exercising. She denies water retention or swelling in her legs prior to her menstrual cycle. Candelario denies any current issues with high cholesterol or blood sugar.    MEDICATIONS:  Candelario is currently using Ivermectin cream for rosacea and has an IUD for birth control. She has discontinued Cymbalta (duloxetine), Lexapro (escitalopram), and Wellbutrin (bupropion). The discontinuation of Wellbutrin may be due to side effects including feeling lightheaded, dizzy, and potentially experiencing hives.    MEDICAL HISTORY:  Candelario has a history of anxiety, which was diagnosed in the past. She also has rosacea, diagnosed by a dermatologist. Candelario's last menstrual period (LMP) was on May 3rd. She is currently using an IUD for contraception.    TEST RESULTS:  In 2018, the patient's thyroid test result was 0.767, which was within normal range. In 2023, she underwent a chemistry panel, which showed normal results for blood sugar, electrolytes, kidney, and liver tests. A complete blood count in 2023 was also normal, indicating no anemia. Additionally, a pregnancy test was conducted in 2023. Candelario underwent a Covid test in 2024 and a strep test in the same year. She also had a flu test in 2023.    IMAGING:  Candelario had CTs of her head in 2019 and 2023, with the latter showing no major issues. A CT of the cervical spine in 2023 revealed a straightened spine (too stiff and straight) but no arthritis, breaks, or fractures. A CT of   the facial bones in 2023 showed no cracks and was deemed okay.    SOCIAL HISTORY:  Occupation:  at [employer]    Marital status: Single, not dating    ROS:  General: -fever, -chills, -fatigue, -weight gain, -weight loss  Cardiovascular: -chest pain, -palpitations, -lower extremity edema  Respiratory: -cough, -shortness of breath, +snoring  Gastrointestinal: -abdominal pain, -nausea, -vomiting, -diarrhea, -constipation,  "-blood in stool  Skin: -rash, -lesion, +flushing  Neurological: +headache, -dizziness, -numbness, -tingling, +memory problems, +memory loss, +difficulty falling asleep, +confusion or disorientation  Psychiatric: +irritability, +new or worsening mood changes, +mood swings  Female Genitourinary: +menstrual pain or symptoms  Musculoskeletal: +back pain         Past Medical History:   Diagnosis Date    Hypertension      Social History[1]  Past Surgical History:   Procedure Laterality Date    FOOT SURGERY Right     fracture repair     Family History   Problem Relation Name Age of Onset    Asthma Mother      Allergies Mother      Thyroid disease Father          hypothyroidism    Cancer Paternal Grandmother          lung cancer    Hypertension Paternal Grandfather      Diabetes Paternal Grandfather         Objective:      Physical Exam  Exam conducted with a chaperone present (Micaela GOMEZ).   Constitutional:       Appearance: Normal appearance.   Neurological:      Mental Status: She is alert.   Psychiatric:         Attention and Perception: Attention normal.         Mood and Affect: Mood is not elated. Affect is not angry or inappropriate.       Blood pressure 128/87, pulse 71, height 5' 7" (1.702 m), weight 103 kg (227 lb 1.2 oz), last menstrual period 05/03/2025. Body mass index is 35.56 kg/m².  Physical Exam    Vitals: Normal blood pressure.  General: No acute distress. Well-developed. Well-nourished.  Eyes: EOMI. Sclerae anicteric.  Cardiovascular: Regular rate. Regular rhythm. No murmurs. No rubs. No gallops. Normal S1, S2.  Respiratory: Normal respiratory effort. Clear to auscultation bilaterally. No rales. No rhonchi. No wheezing.  Abdomen soft, no rigidity or rebound.  Musculoskeletal: No  obvious deformity.  Extremities: No lower extremity edema.  Neurological: Alert & oriented x3.  Psychiatric:  Mostly euthymic and cooperative Skin: Warm. Dry.              Assessment:       No visits with results within 3 Month(s) " from this visit.   Latest known visit with results is:   Patient Outreach on 09/18/2024   Component Date Value Ref Range Status    Chlamydia, Nuc. Acid Amp 01/05/2023 Negative   Final    PAP Recommendation External 01/04/2024 Pap in 3 years   Final    Pap 01/04/2024 Negative for intraephithelial lesion or malignancy  Negative for intraephithelial lesion or malignancy, Other Final       Assessment & Plan    F32.81 Premenstrual dysphoric disorder  F41.9 Anxiety disorder, unspecified  L71.9 Rosacea, unspecified  M40.50 Lordosis, unspecified, site unspecified  T43.295D Adverse effect of other antidepressants, subsequent encounter  Z97.5 Presence of (intrauterine) contraceptive device  Z57.8 Occupational exposure to other risk factors    IMPRESSION:  - Suspect menstrual migraines or premenstrual dysphoric disorder based on reported symptoms occurring 1-2 weeks before menstrual cycle.  - Considered hormonal regulation as first-line treatment, but deferred to gynecologist given patient's access to specialized care.  - If hormonal treatment ineffective or gynecologist determines hormonal treatment not appropriate: Start sertraline (Zoloft) 50 mg daily, beginning 7 days before expected menstrual cycle and continuing until cycle starts.  - Explained potential link between fluid retention before menstrual cycle and headache exacerbation.    F32.81 PREMENSTRUAL DYSPHORIC DISORDER:  - Referred the patient to established gynecologist, Dr. Cummings, for evaluation of menstrual-related symptoms and potential hormonal treatment.  - Candelario experiences irritability, crying, feeling out of it, headaches, and migraines 1-2 weeks before menstrual cycle, with symptoms resolving after cycle begins.  - Diagnosed with menstrual migraines or menstrual dysphoric disorder due to hormonal changes.  - If hormonal treatment is ineffective or inappropriate, recommended sertraline (Zoloft) 50 mg daily, beginning 7 days before expected menstrual cycle  and continuing until cycle starts.  - Instructed patient to contact office if unable to secure gynecologist appointment or if symptoms are determined not related to menstrual cycle.    F41.9 ANXIETY DISORDER, UNSPECIFIED:  - Candelario reports feeling spaced out with memory loss during episodes, describing current symptoms as different from previous anxiety diagnosis.  - Reviewed history of anxiety and previous medications including Cymbalta and Lexapro.  - Recommend sertraline (Zoloft) as noted above if hormonal treatment is ineffective.  - Educated patient on caffeine intake and its potential impact on focus and job performance.    L71.9 ROSACEA, UNSPECIFIED:  - Candelario uses ivermectin cream for rosacea management and reports facial flushing during episodes.  - Advised to continue current treatment regimen.    M40.50 LORDOSIS, UNSPECIFIED, SITE UNSPECIFIED:  - Noted patient's cervical spine is straightened, lacking normal curvature, as observed from CT scan results.    T43.295D ADVERSE EFFECT OF OTHER ANTIDEPRESSANTS, SUBSEQUENT ENCOUNTER:  - Documented adverse effects from bupropion, including lightheadedness, dizziness, and possible urticaria.  - Discussed possible side effects of sertraline, including potential weight gain.    Z97.5 PRESENCE OF (INTRAUTERINE) CONTRACEPTIVE DEVICE:  - Candelario has an IUD for birth control.  - This was noted in relation to gynecologist referral for evaluation of menstrual-related symptoms.    Z57.8 OCCUPATIONAL EXPOSURE TO OTHER RISK FACTORS:  - Candelario works as a  with potential exposure to blood, gore, and other elements at crime scenes.  - Ordered HIV and hepatitis C screening due to occupational exposure risk.  - Discussed caffeine intake management for optimal job performance.         Plan:   PMDD (premenstrual dysphoric disorder)  Comments:  Various options discussed including contraceptives and other options including sertraline, fluoxetine and paroxetine    Screening for  human immunodeficiency virus  Comments:  Ordered separately.    Need for hepatitis C screening test  Comments:  Ordered separately        No follow-ups on file.    Current Medications[2]    This note was generated with the assistance of ambient listening technology. Verbal consent was obtained by the patient and accompanying visitor(s) for the recording of patient appointment to facilitate this note. I attest to having reviewed and edited the generated note for accuracy, though some syntax or spelling errors may persist. Please contact the author of this note for any clarification.      Navi Figueroa           [1]   Social History  Socioeconomic History    Marital status: Single   Tobacco Use    Smoking status: Former     Types: Cigarettes, Vaping with nicotine    Smokeless tobacco: Never   Substance and Sexual Activity    Alcohol use: Yes    Drug use: No    Sexual activity: Yes     Partners: Male   [2]   Current Outpatient Medications:     ivermectin (SOOLANTRA) 1 % Crea, Compound oxymetazoline 1%, ivermectin 1% niacinamide 2% and apply to face QAM, Disp: 45 g, Rfl: 3

## 2025-05-08 ENCOUNTER — PATIENT MESSAGE (OUTPATIENT)
Dept: FAMILY MEDICINE | Facility: CLINIC | Age: 25
End: 2025-05-08

## 2025-05-08 ENCOUNTER — OFFICE VISIT (OUTPATIENT)
Dept: FAMILY MEDICINE | Facility: CLINIC | Age: 25
End: 2025-05-08
Payer: OTHER GOVERNMENT

## 2025-05-08 ENCOUNTER — LAB VISIT (OUTPATIENT)
Dept: LAB | Facility: HOSPITAL | Age: 25
End: 2025-05-08
Attending: INTERNAL MEDICINE
Payer: OTHER GOVERNMENT

## 2025-05-08 VITALS
HEART RATE: 71 BPM | WEIGHT: 227.06 LBS | HEIGHT: 67 IN | SYSTOLIC BLOOD PRESSURE: 128 MMHG | BODY MASS INDEX: 35.64 KG/M2 | DIASTOLIC BLOOD PRESSURE: 87 MMHG

## 2025-05-08 DIAGNOSIS — Z11.59 NEED FOR HEPATITIS C SCREENING TEST: ICD-10-CM

## 2025-05-08 DIAGNOSIS — Z00.00 PHYSICAL EXAM, ANNUAL: Primary | ICD-10-CM

## 2025-05-08 DIAGNOSIS — Z11.4 SCREENING FOR HIV (HUMAN IMMUNODEFICIENCY VIRUS): ICD-10-CM

## 2025-05-08 DIAGNOSIS — Z00.00 PHYSICAL EXAM, ANNUAL: ICD-10-CM

## 2025-05-08 DIAGNOSIS — Z11.4 SCREENING FOR HUMAN IMMUNODEFICIENCY VIRUS: ICD-10-CM

## 2025-05-08 DIAGNOSIS — F32.81 PMDD (PREMENSTRUAL DYSPHORIC DISORDER): Primary | ICD-10-CM

## 2025-05-08 LAB
ABSOLUTE EOSINOPHIL (OHS): 0.14 K/UL
ABSOLUTE MONOCYTE (OHS): 0.55 K/UL (ref 0.3–1)
ABSOLUTE NEUTROPHIL COUNT (OHS): 4.55 K/UL (ref 1.8–7.7)
BASOPHILS # BLD AUTO: 0.04 K/UL
BASOPHILS NFR BLD AUTO: 0.5 %
CHOLEST SERPL-MCNC: 219 MG/DL (ref 120–199)
CHOLEST/HDLC SERPL: 4.4 {RATIO} (ref 2–5)
EAG (OHS): 97 MG/DL (ref 68–131)
ERYTHROCYTE [DISTWIDTH] IN BLOOD BY AUTOMATED COUNT: 13.8 % (ref 11.5–14.5)
HBA1C MFR BLD: 5 % (ref 4–5.6)
HCT VFR BLD AUTO: 47.6 % (ref 37–48.5)
HDLC SERPL-MCNC: 50 MG/DL (ref 40–75)
HDLC SERPL: 22.8 % (ref 20–50)
HGB BLD-MCNC: 15.2 GM/DL (ref 12–16)
IMM GRANULOCYTES # BLD AUTO: 0.01 K/UL (ref 0–0.04)
IMM GRANULOCYTES NFR BLD AUTO: 0.1 % (ref 0–0.5)
LDLC SERPL CALC-MCNC: 145.2 MG/DL (ref 63–159)
LYMPHOCYTES # BLD AUTO: 2.52 K/UL (ref 1–4.8)
MCH RBC QN AUTO: 29 PG (ref 27–31)
MCHC RBC AUTO-ENTMCNC: 31.9 G/DL (ref 32–36)
MCV RBC AUTO: 91 FL (ref 82–98)
NONHDLC SERPL-MCNC: 169 MG/DL
NUCLEATED RBC (/100WBC) (OHS): 0 /100 WBC
PLATELET # BLD AUTO: 301 K/UL (ref 150–450)
PMV BLD AUTO: 11 FL (ref 9.2–12.9)
RBC # BLD AUTO: 5.24 M/UL (ref 4–5.4)
RELATIVE EOSINOPHIL (OHS): 1.8 %
RELATIVE LYMPHOCYTE (OHS): 32.3 % (ref 18–48)
RELATIVE MONOCYTE (OHS): 7 % (ref 4–15)
RELATIVE NEUTROPHIL (OHS): 58.3 % (ref 38–73)
TRIGL SERPL-MCNC: 119 MG/DL (ref 30–150)
WBC # BLD AUTO: 7.81 K/UL (ref 3.9–12.7)

## 2025-05-08 PROCEDURE — 99214 OFFICE O/P EST MOD 30 MIN: CPT | Mod: S$GLB,,, | Performed by: INTERNAL MEDICINE

## 2025-05-08 PROCEDURE — 80061 LIPID PANEL: CPT

## 2025-05-08 PROCEDURE — 85025 COMPLETE CBC W/AUTO DIFF WBC: CPT

## 2025-05-08 PROCEDURE — 36415 COLL VENOUS BLD VENIPUNCTURE: CPT | Mod: PN

## 2025-05-08 PROCEDURE — 87389 HIV-1 AG W/HIV-1&-2 AB AG IA: CPT

## 2025-05-08 PROCEDURE — 83036 HEMOGLOBIN GLYCOSYLATED A1C: CPT

## 2025-05-08 PROCEDURE — 86803 HEPATITIS C AB TEST: CPT

## 2025-05-08 PROCEDURE — 99999 PR PBB SHADOW E&M-EST. PATIENT-LVL III: CPT | Mod: PBBFAC,,, | Performed by: INTERNAL MEDICINE

## 2025-05-08 PROCEDURE — 84443 ASSAY THYROID STIM HORMONE: CPT

## 2025-05-08 NOTE — PROGRESS NOTES
Physical exam, annual  -     Lipid Panel; Future; Expected date: 05/09/2025  -     TSH; Future; Expected date: 05/09/2025  -     CBC Auto Differential; Future; Expected date: 05/09/2025  -     Hemoglobin A1C; Future; Expected date: 05/09/2025    Screening for HIV (human immunodeficiency virus)  -     HIV 1/2 Ag/Ab (4th Gen); Future; Expected date: 05/08/2025    Need for hepatitis C screening test  -     Hepatitis C Antibody; Future; Expected date: 05/08/2025

## 2025-05-09 ENCOUNTER — RESULTS FOLLOW-UP (OUTPATIENT)
Dept: FAMILY MEDICINE | Facility: CLINIC | Age: 25
End: 2025-05-09

## 2025-05-09 LAB
HCV AB SERPL QL IA: NORMAL
HIV 1+2 AB+HIV1 P24 AG SERPL QL IA: NORMAL
TSH SERPL-ACNC: 1.92 UIU/ML (ref 0.4–4)

## 2025-06-27 ENCOUNTER — PATIENT MESSAGE (OUTPATIENT)
Dept: FAMILY MEDICINE | Facility: CLINIC | Age: 25
End: 2025-06-27
Payer: OTHER GOVERNMENT

## 2025-08-26 ENCOUNTER — PATIENT MESSAGE (OUTPATIENT)
Dept: FAMILY MEDICINE | Facility: CLINIC | Age: 25
End: 2025-08-26

## 2025-08-26 ENCOUNTER — OFFICE VISIT (OUTPATIENT)
Dept: FAMILY MEDICINE | Facility: CLINIC | Age: 25
End: 2025-08-26
Payer: OTHER GOVERNMENT

## 2025-08-26 VITALS
WEIGHT: 220.44 LBS | HEART RATE: 88 BPM | HEIGHT: 67 IN | SYSTOLIC BLOOD PRESSURE: 107 MMHG | DIASTOLIC BLOOD PRESSURE: 77 MMHG | BODY MASS INDEX: 34.6 KG/M2

## 2025-08-26 DIAGNOSIS — F32.81 PREMENSTRUAL DYSPHORIC DISORDER: ICD-10-CM

## 2025-08-26 DIAGNOSIS — Z00.00 PHYSICAL EXAM, ANNUAL: Primary | ICD-10-CM

## 2025-08-26 PROCEDURE — 99999 PR PBB SHADOW E&M-EST. PATIENT-LVL III: CPT | Mod: PBBFAC,,, | Performed by: INTERNAL MEDICINE

## 2025-08-26 RX ORDER — SERTRALINE HYDROCHLORIDE 50 MG/1
TABLET, FILM COATED ORAL
Qty: 45 TABLET | Refills: 3 | Status: SHIPPED | OUTPATIENT
Start: 2025-08-26 | End: 2025-08-27 | Stop reason: SDUPTHER

## 2025-08-27 DIAGNOSIS — F32.81 PREMENSTRUAL DYSPHORIC DISORDER: ICD-10-CM

## 2025-08-27 RX ORDER — SERTRALINE HYDROCHLORIDE 50 MG/1
TABLET, FILM COATED ORAL
Qty: 45 TABLET | Refills: 3 | Status: SHIPPED | OUTPATIENT
Start: 2025-08-27